# Patient Record
Sex: FEMALE | Race: WHITE | NOT HISPANIC OR LATINO | Employment: FULL TIME | URBAN - METROPOLITAN AREA
[De-identification: names, ages, dates, MRNs, and addresses within clinical notes are randomized per-mention and may not be internally consistent; named-entity substitution may affect disease eponyms.]

---

## 2017-01-06 DIAGNOSIS — Z12.31 OTHER SCREENING MAMMOGRAM: ICD-10-CM

## 2017-02-21 ENCOUNTER — PATIENT MESSAGE (OUTPATIENT)
Dept: FAMILY MEDICINE | Facility: CLINIC | Age: 42
End: 2017-02-21

## 2017-02-23 ENCOUNTER — OFFICE VISIT (OUTPATIENT)
Dept: FAMILY MEDICINE | Facility: CLINIC | Age: 42
End: 2017-02-23
Payer: COMMERCIAL

## 2017-02-23 ENCOUNTER — LAB VISIT (OUTPATIENT)
Dept: LAB | Facility: HOSPITAL | Age: 42
End: 2017-02-23
Payer: COMMERCIAL

## 2017-02-23 VITALS
HEART RATE: 84 BPM | BODY MASS INDEX: 24.92 KG/M2 | SYSTOLIC BLOOD PRESSURE: 97 MMHG | DIASTOLIC BLOOD PRESSURE: 61 MMHG | TEMPERATURE: 99 F | WEIGHT: 149.56 LBS | HEIGHT: 65 IN

## 2017-02-23 DIAGNOSIS — N92.6 MENSTRUAL ABNORMALITY: ICD-10-CM

## 2017-02-23 DIAGNOSIS — R60.0 BILATERAL LOWER EXTREMITY EDEMA: Primary | ICD-10-CM

## 2017-02-23 LAB
ALBUMIN SERPL BCP-MCNC: 3.7 G/DL
ALP SERPL-CCNC: 31 U/L
ALT SERPL W/O P-5'-P-CCNC: 27 U/L
ANION GAP SERPL CALC-SCNC: 6 MMOL/L
AST SERPL-CCNC: 15 U/L
B-HCG UR QL: POSITIVE
BACTERIA #/AREA URNS HPF: ABNORMAL /HPF
BASOPHILS # BLD AUTO: 0.07 K/UL
BASOPHILS NFR BLD: 1 %
BILIRUB SERPL-MCNC: 0.3 MG/DL
BILIRUB UR QL STRIP: NEGATIVE
BNP SERPL-MCNC: 50 PG/ML
BUN SERPL-MCNC: 13 MG/DL
CALCIUM SERPL-MCNC: 9 MG/DL
CHLORIDE SERPL-SCNC: 104 MMOL/L
CLARITY UR: CLEAR
CO2 SERPL-SCNC: 25 MMOL/L
COLOR UR: YELLOW
CREAT SERPL-MCNC: 0.7 MG/DL
CREAT UR-MCNC: 84 MG/DL
CTP QC/QA: YES
DIFFERENTIAL METHOD: ABNORMAL
EOSINOPHIL # BLD AUTO: 0.1 K/UL
EOSINOPHIL NFR BLD: 1.9 %
ERYTHROCYTE [DISTWIDTH] IN BLOOD BY AUTOMATED COUNT: 13.2 %
EST. GFR  (AFRICAN AMERICAN): >60 ML/MIN/1.73 M^2
EST. GFR  (NON AFRICAN AMERICAN): >60 ML/MIN/1.73 M^2
GLUCOSE SERPL-MCNC: 87 MG/DL
GLUCOSE UR QL STRIP: NEGATIVE
HCT VFR BLD AUTO: 37 %
HGB BLD-MCNC: 12.2 G/DL
HGB UR QL STRIP: NEGATIVE
KETONES UR QL STRIP: NEGATIVE
LEUKOCYTE ESTERASE UR QL STRIP: ABNORMAL
LYMPHOCYTES # BLD AUTO: 1.5 K/UL
LYMPHOCYTES NFR BLD: 19.9 %
MCH RBC QN AUTO: 30.8 PG
MCHC RBC AUTO-ENTMCNC: 33 %
MCV RBC AUTO: 93 FL
MICROALBUMIN UR DL<=1MG/L-MCNC: 3 UG/ML
MICROALBUMIN/CREATININE RATIO: 3.6 UG/MG
MICROSCOPIC COMMENT: ABNORMAL
MONOCYTES # BLD AUTO: 0.8 K/UL
MONOCYTES NFR BLD: 11.1 %
NEUTROPHILS # BLD AUTO: 4.8 K/UL
NEUTROPHILS NFR BLD: 66 %
NITRITE UR QL STRIP: NEGATIVE
PH UR STRIP: 6 [PH] (ref 5–8)
PLATELET # BLD AUTO: 323 K/UL
PMV BLD AUTO: 10.1 FL
POTASSIUM SERPL-SCNC: 3.8 MMOL/L
PROT SERPL-MCNC: 6.7 G/DL
PROT UR QL STRIP: NEGATIVE
RBC # BLD AUTO: 3.96 M/UL
SODIUM SERPL-SCNC: 135 MMOL/L
SP GR UR STRIP: 1.02 (ref 1–1.03)
SQUAMOUS #/AREA URNS HPF: 5 /HPF
T4 FREE SERPL-MCNC: 1.2 NG/DL
TSH SERPL DL<=0.005 MIU/L-ACNC: 0.08 UIU/ML
URN SPEC COLLECT METH UR: ABNORMAL
WBC # BLD AUTO: 7.28 K/UL
WBC #/AREA URNS HPF: 3 /HPF (ref 0–5)

## 2017-02-23 PROCEDURE — 36415 COLL VENOUS BLD VENIPUNCTURE: CPT | Mod: PO

## 2017-02-23 PROCEDURE — 83880 ASSAY OF NATRIURETIC PEPTIDE: CPT

## 2017-02-23 PROCEDURE — 99999 PR PBB SHADOW E&M-EST. PATIENT-LVL III: CPT | Mod: PBBFAC,,,

## 2017-02-23 PROCEDURE — 80053 COMPREHEN METABOLIC PANEL: CPT

## 2017-02-23 PROCEDURE — 84443 ASSAY THYROID STIM HORMONE: CPT

## 2017-02-23 PROCEDURE — 82570 ASSAY OF URINE CREATININE: CPT

## 2017-02-23 PROCEDURE — 99213 OFFICE O/P EST LOW 20 MIN: CPT | Mod: S$GLB,,,

## 2017-02-23 PROCEDURE — 1160F RVW MEDS BY RX/DR IN RCRD: CPT | Mod: S$GLB,,,

## 2017-02-23 PROCEDURE — 81000 URINALYSIS NONAUTO W/SCOPE: CPT | Mod: PO

## 2017-02-23 PROCEDURE — 81025 URINE PREGNANCY TEST: CPT | Mod: S$GLB,,,

## 2017-02-23 PROCEDURE — 85025 COMPLETE CBC W/AUTO DIFF WBC: CPT

## 2017-02-23 PROCEDURE — 84439 ASSAY OF FREE THYROXINE: CPT

## 2017-02-23 NOTE — MR AVS SNAPSHOT
[facility]  [address] [address]  Phone: [phone]  Fax: [phone]                  Vijaya Redding   2017 4:20 PM   Office Visit    Description:  Female : 1975   Provider:  JIM Samuel   Department:  [facility]           Reason for Visit     Foot Swelling           Diagnoses this Visit        Comments    Bilateral lower extremity edema    -  Primary     Menstrual abnormality                To Do List           Goals (5 Years of Data)     None      [facility]     [facility] Care [facility] -  Assistance  Registered nurses in the [facility] Center provide clinical advisement, health education, appointment booking, and other advisory services.  Call for this free service at [phone].             Medications           Message regarding Medications     Verify the changes and/or additions to your medication regime listed below are the same as discussed with your clinician today.  If any of these changes or additions are incorrect, please notify your healthcare provider.        STOP taking these medications     albuterol 90 mcg/actuation inhaler Inhale 2 puffs into the lungs every 6 (six) hours as needed for Wheezing.    dextroamphetamine-amphetamine (ADDERALL XR) 25 MG 24 hr capsule Take 25 mg by mouth every morning.    dextroamphetamine-amphetamine (ADDERALL) 20 mg tablet Take 1 tablet by mouth once daily.    fluticasone (FLONASE) 50 mcg/actuation nasal spray 2 sprays by Each Nare route once daily.    montelukast (SINGULAIR) 10 mg tablet Take 10 mg by mouth once daily.           Verify that the below list of medications is an accurate representation of the medications you are currently taking.  If none reported, the list may be blank. If incorrect, please contact your healthcare provider. Carry this list with you in case of emergency.           Current Medications     rizatriptan (MAXALT-MLT) 10 MG  "disintegrating tablet Take 1 tablet (10 mg total) by mouth as needed for Migraine.           Clinical Reference Information           Your Vitals Were     BP Pulse Temp Height    97/61 (BP Location: Left arm, Patient Position: Sitting, BP Method: Automatic) 84 98.5 °F (36.9 °C) (Oral) 5' 5" (1.651 m)    Weight Last Period BMI    67.8 kg (149 lb 9.3 oz) 01/02/2017 (Exact Date) 24.89 kg/m2      Blood Pressure          Most Recent Value    BP  97/61      Allergies as of 2/23/2017     Sulfa (Sulfonamide Antibiotics)      Immunizations Administered on Date of Encounter - 2/23/2017     None      Orders Placed During Today's Visit      Normal Orders This Visit    Microalbumin/creatinine urine ratio     POCT Urine Pregnancy     Urinalysis     Future Labs/Procedures Expected by Expires    Brain natriuretic peptide  2/23/2017 4/24/2018    CBC auto differential  2/23/2017 4/24/2018    Comprehensive metabolic panel  2/23/2017 4/24/2018    TSH  2/23/2017 4/24/2018      Language Assistance Services     ATTENTION: Language assistance services are available, free of charge. Please call [phone].      ATENCIÓN: Si habla español, tiene a fuentes disposición servicios gratuitos de asistencia lingüística. Llame al [phone].     WILLIAM Ý: N?u b?n nói Ti?ng Vi?t, có các d?ch v? h? tr? ngôn ng? mi?n phí dành cho b?n. G?i s? [phone].         [facility] complies with applicable Federal civil rights laws and does not discriminate on the basis of race, color, national origin, age, disability, or sex.        "

## 2017-02-23 NOTE — PROGRESS NOTES
Subjective:       Patient ID: Vijaya Redding is a 41 y.o. female.    Chief Complaint: Foot Swelling    HPI   The patient presents today with a two-week complaint of bilateral foot swelling.  She says the swelling is intermittent and mild in intensity.  She says it does resolve after sleeping at night but usually within a few hours of waking the swelling has returned.  She says in the evenings when she squats to do things she can feel a tightness in her legs.  She denies any pain in the legs.  The patient states she does not eat excessive amounts of sodium.  She denies any chest pain or shortness of breath.    The patient notes incidentally that she missed her menstrual cycle this month she says they usually occur around the beginning of the month she has regular cycles usually.  She states she is sexually active in a monogamous relationship with her  they are not using any form of birth control.  She says she has an appointment with her OB/GYN next week.     Review of Systems   Constitutional: Negative for activity change, appetite change, fatigue and unexpected weight change.   HENT: Negative.    Eyes: Negative.    Respiratory: Negative for cough, chest tightness, shortness of breath and wheezing.    Cardiovascular: Positive for leg swelling. Negative for chest pain and palpitations.   Gastrointestinal: Negative for constipation, diarrhea, nausea and vomiting.   Endocrine: Negative.    Genitourinary: Negative.    Musculoskeletal: Negative.    Skin: Negative for color change.   Allergic/Immunologic: Negative.    Neurological: Negative for dizziness, weakness and light-headedness.   Hematological: Negative.    Psychiatric/Behavioral: Negative for sleep disturbance.         Objective:      Physical Exam   Constitutional: She is oriented to person, place, and time. She appears well-developed and well-nourished.   HENT:   Head: Normocephalic and atraumatic.   Eyes: Conjunctivae are normal. Pupils are equal,  round, and reactive to light. No scleral icterus.   Neck: Normal range of motion. Neck supple.   Cardiovascular: Normal rate, regular rhythm, normal heart sounds and intact distal pulses.  Exam reveals no gallop and no friction rub.    No murmur heard.  Pulses:       Radial pulses are 2+ on the right side, and 2+ on the left side.        Dorsalis pedis pulses are 2+ on the right side, and 2+ on the left side.        Posterior tibial pulses are 2+ on the right side, and 2+ on the left side.   1+ edema to bilateral lower extremities   Pulmonary/Chest: Effort normal and breath sounds normal. No respiratory distress. She has no wheezes. She has no rales. She exhibits no tenderness.   Musculoskeletal: Normal range of motion.   Lymphadenopathy:     She has no cervical adenopathy.   Neurological: She is alert and oriented to person, place, and time. She exhibits normal muscle tone. Coordination normal.   Skin: Skin is warm and dry. No rash noted. No erythema. No pallor.   Psychiatric: She has a normal mood and affect. Her behavior is normal. Judgment and thought content normal.   Vitals reviewed.      Assessment:       1. Bilateral lower extremity edema    2. Menstrual abnormality          Plan:   Bilateral lower extremity edema  -     Urinalysis  -     Microalbumin/creatinine urine ratio  -     Brain natriuretic peptide; Future; Expected date: 2/23/17  -     CBC auto differential; Future; Expected date: 2/23/17  -     Comprehensive metabolic panel; Future; Expected date: 2/23/17  -     TSH; Future; Expected date: 2/23/17  -     Urinalysis Microscopic        -      He was advised to elevate her legs at night and use compression hose.     Menstrual abnormality  -     Urinalysis  -     Microalbumin/creatinine urine ratio  -     Brain natriuretic peptide; Future; Expected date: 2/23/17  -     CBC auto differential; Future; Expected date: 2/23/17  -     Comprehensive metabolic panel; Future; Expected date: 2/23/17  -     TSH;  Future; Expected date: 2/23/17  -     POCT Urine Pregnancy, positive  -     Urinalysis Microscopic        -     The patient was advised to keep her appointment with her OB/GYN        -     She was offered prenatal vitamins but said she would wait and see which ones are OB/GYN recommends.           Disclaimer: This note is prepared using voice recognition software.  As such there may be errors in the dictation.  It has not been proofread.

## 2017-02-24 DIAGNOSIS — R79.89 LOW TSH LEVEL: Primary | ICD-10-CM

## 2017-03-17 ENCOUNTER — TELEPHONE (OUTPATIENT)
Dept: ENDOCRINOLOGY | Facility: CLINIC | Age: 42
End: 2017-03-17

## 2017-03-17 NOTE — TELEPHONE ENCOUNTER
attempted to reach pt, appt was scheduled per dr alvarenga for [facility], educated pt to call back if unable to make this appt

## 2017-03-17 NOTE — TELEPHONE ENCOUNTER
----- Message from Chau Small sent at 3/16/2017 11:16 AM CDT -----  Contact: self   [phone]  Patient needs an earlier appointment to see the doctor for thyroid problem. Patient is 10 weeks pregnant, asking to be see earlier. Patient will be in the [place] area on 03/29/2017, patient asking to be seen if possible on this day.Thanks!

## 2017-03-23 ENCOUNTER — OFFICE VISIT (OUTPATIENT)
Dept: ENDOCRINOLOGY | Facility: CLINIC | Age: 42
End: 2017-03-23
Payer: COMMERCIAL

## 2017-03-23 VITALS
HEART RATE: 76 BPM | WEIGHT: 147.69 LBS | HEIGHT: 65 IN | BODY MASS INDEX: 24.61 KG/M2 | SYSTOLIC BLOOD PRESSURE: 101 MMHG | DIASTOLIC BLOOD PRESSURE: 63 MMHG

## 2017-03-23 DIAGNOSIS — R79.89 ABNORMAL TSH: ICD-10-CM

## 2017-03-23 DIAGNOSIS — R00.2 PALPITATIONS: ICD-10-CM

## 2017-03-23 DIAGNOSIS — E05.90 SUBCLINICAL HYPERTHYROIDISM: Primary | ICD-10-CM

## 2017-03-23 PROCEDURE — 99204 OFFICE O/P NEW MOD 45 MIN: CPT | Mod: S$GLB,,, | Performed by: INTERNAL MEDICINE

## 2017-03-23 PROCEDURE — 99999 PR PBB SHADOW E&M-EST. PATIENT-LVL III: CPT | Mod: PBBFAC,,, | Performed by: INTERNAL MEDICINE

## 2017-03-23 PROCEDURE — 1160F RVW MEDS BY RX/DR IN RCRD: CPT | Mod: S$GLB,,, | Performed by: INTERNAL MEDICINE

## 2017-03-23 NOTE — LETTER
March 23, 2017      Dharmesh Gibbons DO  [address] [facility] [address] [place] [address]           [facility]  [address]  [facility] [address]  Phone: [phone]  Fax: [phone]          Patient: Vijaya Redding   MR Number: [id number]   YOB: 1975   Date of Visit: 3/23/2017       Dear Dr. Dharmesh Gibbons:    Thank you for referring Vijaya Redding to me for evaluation. Attached you will find relevant portions of my assessment and plan of care.    If you have questions, please do not hesitate to call me. I look forward to following Vijaya Redding along with you.    Sincerely,    Carlos Eduardo Albarran MD    Enclosure  CC:  No Recipients    If you would like to receive this communication electronically, please contact [email or web address] or [phone] to request more information on [employer] Link access.    For providers and/or their staff who would like to refer a patient to [facility], please contact us through our one-stop-shop provider referral line, [facility], at [phone].    If you feel you have received this communication in error or would no longer like to receive these types of communications, please e-mail [email or web address]

## 2017-03-23 NOTE — MR AVS SNAPSHOT
"    [facility]  [address]  [place] [address]  Phone: [phone]  Fax: [phone]                  Vijaya Redding   3/23/2017 11:00 AM   Office Visit    Description:  Female : 1975   Provider:  Carlos Eduardo Albarran MD   Department:  [facility]           Reason for Visit     Thyroid Problem           Diagnoses this Visit        Comments    Abnormal TSH                To Do List           Future Appointments        Provider Department Dept Phone    3/23/2017 11:50 AM LAB, APPOINTMENT [place] [facility] [phone]      Goals (5 Years of Data)     None      [facility]     [facility] Nurse Care Line -  Assistance  Registered nurses in the [facility] provide clinical advisement, health education, appointment booking, and other advisory services.  Call for this free service at [phone].             Medications           Message regarding Medications     Verify the changes and/or additions to your medication regime listed below are the same as discussed with your clinician today.  If any of these changes or additions are incorrect, please notify your healthcare provider.             Verify that the below list of medications is an accurate representation of the medications you are currently taking.  If none reported, the list may be blank. If incorrect, please contact your healthcare provider. Carry this list with you in case of emergency.           Current Medications     rizatriptan (MAXALT-MLT) 10 MG disintegrating tablet Take 1 tablet (10 mg total) by mouth as needed for Migraine.           Clinical Reference Information           Your Vitals Were     BP Pulse Height Weight Last Period BMI    101/63 77 5' 5" (1.651 m) 67 kg (147 lb 11.3 oz) 2017 (Exact Date) 24.58 kg/m2      Blood Pressure          Most Recent Value    BP  101/63      Allergies as of 3/23/2017     Sulfa (Sulfonamide Antibiotics)    "   Immunizations Administered on Date of Encounter - 3/23/2017     None      Orders Placed During Today's Visit     Future Labs/Procedures Expected by Expires    HCG, QUANTITATIVE, PREGNANCY  3/23/2017 5/22/2018    T3  3/23/2017 5/22/2018    T4, free  3/23/2017 5/22/2018    Thyroid stimulating immunoglobulin  3/23/2017 5/22/2018    THYROTROPIN RECEPTOR ANTIBODY  3/23/2017 5/22/2018    TSH  3/23/2017 5/22/2018      Language Assistance Services     ATTENTION: Language assistance services are available, free of charge. Please call [phone].      ATENCIÓN: Si habla español, tiene a fuentes disposición servicios gratuitos de asistencia lingüística. Llame al [phone].     CHÚ Ý: N?u b?n nói Ti?ng Vi?t, có các d?ch v? h? tr? ngôn ng? mi?n phí dành cho b?n. G?i s? [phone].         [facility] complies with applicable Federal civil rights laws and does not discriminate on the basis of race, color, national origin, age, disability, or sex.

## 2017-03-23 NOTE — PROGRESS NOTES
Subjective:      Patient ID: Vijaya Redding is a 41 y.o. female.    Chief Complaint:  Thyroid Problem      History of Present Illness  Ms. Redding presents for evaluation and management of a suppressed TSH.     Has active history of migraines.     Was seen by primary care and had labs done at that time that showed that she had a low TSH with normal FT4 and positive pregnancy test.    LMP was Jan 2nd. Currently 11 weeks pregnant.    Has occasional heart palpitations, but not persistent. Has overt fatigue. Has had some heat intolerance and night sweats.  Bowel movements more frequent. No excessive hair loss.     No anterior neck fullness or pain. No dysphagia.     Denies proptosis, eyelid swelling, eye pain, or conjunctival erythema.    No OTC supplements. Only on B6 and folic acid.     Has been having excessive nausea and has been vomiting twice daily. Has been trying to stay well hydrated.    Review of Systems   Constitutional: Negative for unexpected weight change.   HENT: Negative for voice change.    Eyes: Negative for visual disturbance.   Respiratory: Negative for shortness of breath.    Cardiovascular: Positive for leg swelling. Negative for chest pain.   Gastrointestinal: Negative for abdominal pain.   Endocrine: Negative for cold intolerance.   Genitourinary: Negative for frequency.   Musculoskeletal: Negative for myalgias.   Skin: Negative for rash.   As above    Objective:   Physical Exam   Constitutional: She is oriented to person, place, and time. She appears well-developed and well-nourished.   HENT:   Head: Normocephalic and atraumatic.   Right Ear: External ear normal.   Left Ear: External ear normal.   Nose: Nose normal.   No proptosis, eyelid swelling or conjunctival erythema   Neck: No tracheal deviation present.   No goiter   Cardiovascular: Normal rate, regular rhythm and normal heart sounds.    No edema   Pulmonary/Chest: Effort normal and breath sounds normal.   Abdominal: Soft. Bowel  sounds are normal. There is no tenderness.   Musculoskeletal:   Normal gait, no cyanosis or clubbing   Neurological: She is alert and oriented to person, place, and time. She has normal reflexes.   Vibration sense intact  No tremor   Skin: Skin is warm and dry. No rash noted.   No nodules, no ulcers   Psychiatric: She has a normal mood and affect. Judgment normal.   Vitals reviewed.  No Tachycardia    Lab Review:   Results for PETRA VILLELA (MRN [id number]) as of 3/23/2017 11:02   Ref. Range 2/23/2017 16:59 2/23/2017 17:08   WBC Latest Ref Range: 3.90 - 12.70 K/uL 7.28    RBC Latest Ref Range: 4.00 - 5.40 M/uL 3.96 (L)    Hemoglobin Latest Ref Range: 12.0 - 16.0 g/dL 12.2    Hematocrit Latest Ref Range: 37.0 - 48.5 % 37.0    MCV Latest Ref Range: 82 - 98 fL 93    MCH Latest Ref Range: 27.0 - 31.0 pg 30.8    MCHC Latest Ref Range: 32.0 - 36.0 % 33.0    RDW Latest Ref Range: 11.5 - 14.5 % 13.2    Platelets Latest Ref Range: 150 - 350 K/uL 323    MPV Latest Ref Range: 9.2 - 12.9 fL 10.1    Gran% Latest Ref Range: 38.0 - 73.0 % 66.0    Gran # Latest Ref Range: 1.8 - 7.7 K/uL 4.8    Lymph% Latest Ref Range: 18.0 - 48.0 % 19.9    Lymph # Latest Ref Range: 1.0 - 4.8 K/uL 1.5    Mono% Latest Ref Range: 4.0 - 15.0 % 11.1    Mono # Latest Ref Range: 0.3 - 1.0 K/uL 0.8    Eosinophil% Latest Ref Range: 0.0 - 8.0 % 1.9    Eos # Latest Ref Range: 0.0 - 0.5 K/uL 0.1    Basophil% Latest Ref Range: 0.0 - 1.9 % 1.0    Baso # Latest Ref Range: 0.00 - 0.20 K/uL 0.07    Sodium Latest Ref Range: 136 - 145 mmol/L 135 (L)    Potassium Latest Ref Range: 3.5 - 5.1 mmol/L 3.8    Chloride Latest Ref Range: 95 - 110 mmol/L 104    CO2 Latest Ref Range: 23 - 29 mmol/L 25    Anion Gap Latest Ref Range: 8 - 16 mmol/L 6 (L)    BUN, Bld Latest Ref Range: 6 - 20 mg/dL 13    Creatinine Latest Ref Range: 0.5 - 1.4 mg/dL 0.7    eGFR if non African American Latest Ref Range: >60 mL/min/1.73 m^2 >60.0    eGFR if African American Latest Ref Range: >60  mL/min/1.73 m^2 >60.0    Glucose Latest Ref Range: 70 - 110 mg/dL 87    Calcium Latest Ref Range: 8.7 - 10.5 mg/dL 9.0    Alkaline Phosphatase Latest Ref Range: 55 - 135 U/L 31 (L)    Total Protein Latest Ref Range: 6.0 - 8.4 g/dL 6.7    Albumin Latest Ref Range: 3.5 - 5.2 g/dL 3.7    Total Bilirubin Latest Ref Range: 0.1 - 1.0 mg/dL 0.3    AST Latest Ref Range: 10 - 40 U/L 15    ALT Latest Ref Range: 10 - 44 U/L 27    BNP Latest Ref Range: 0 - 99 pg/mL 50    TSH Latest Ref Range: 0.400 - 4.000 uIU/mL 0.084 (L)    Free T4 Latest Ref Range: 0.71 - 1.51 ng/dL 1.20    Preg Test, Ur Latest Ref Range: Negative   Positive (A)       Assessment:     1. Subclinical hyperthyroidism    2. Abnormal TSH    3. Palpitations      Plan:     --Patient found to have suppressed TSH associated with palpitations and leg swelling  --Currently 11 weeks pregnant  --Differential includes gestational hyperthyroidism vs autoimmune thyroid disease (Grave's)  --Timing of symptoms, hyperemesis gravidarum, lack of goiter and lack of Graves orbitopathy make gestational hyperthyroidism the most likely diagnosis  --Will check TRAb and TSI to be sure this isn't Graves disease  --Treatment for gestational hyperthyroidism is supportive only, but B blocker could be used if needed for symptom control should symptoms worsen (would have to be approved by Ob first)  --In any event she does not meet criteria for methimazole or PTU at this time as the FT4 and total T3 are in the normal range  --Will plan to repeat TFT's in 4-6 weeks    Carlos Eduardo Albarran M.D. Staff Endocrinology

## 2017-03-24 ENCOUNTER — PATIENT MESSAGE (OUTPATIENT)
Dept: ENDOCRINOLOGY | Facility: CLINIC | Age: 42
End: 2017-03-24

## 2017-03-27 ENCOUNTER — PATIENT MESSAGE (OUTPATIENT)
Dept: ENDOCRINOLOGY | Facility: CLINIC | Age: 42
End: 2017-03-27

## 2017-03-27 DIAGNOSIS — E05.90 SUBCLINICAL HYPERTHYROIDISM: Primary | ICD-10-CM

## 2017-04-24 ENCOUNTER — LAB VISIT (OUTPATIENT)
Dept: LAB | Facility: HOSPITAL | Age: 42
End: 2017-04-24
Attending: INTERNAL MEDICINE
Payer: COMMERCIAL

## 2017-04-24 DIAGNOSIS — E05.90 SUBCLINICAL HYPERTHYROIDISM: ICD-10-CM

## 2017-04-24 PROCEDURE — 84439 ASSAY OF FREE THYROXINE: CPT

## 2017-04-24 PROCEDURE — 84443 ASSAY THYROID STIM HORMONE: CPT

## 2017-04-24 PROCEDURE — 36415 COLL VENOUS BLD VENIPUNCTURE: CPT | Mod: PO

## 2017-04-24 PROCEDURE — 84480 ASSAY TRIIODOTHYRONINE (T3): CPT

## 2017-04-25 LAB
T3 SERPL-MCNC: 125 NG/DL
T4 FREE SERPL-MCNC: 0.81 NG/DL
TSH SERPL DL<=0.005 MIU/L-ACNC: 0.48 UIU/ML

## 2017-04-26 ENCOUNTER — PATIENT MESSAGE (OUTPATIENT)
Dept: ENDOCRINOLOGY | Facility: CLINIC | Age: 42
End: 2017-04-26

## 2017-04-26 DIAGNOSIS — E05.90 SUBCLINICAL HYPERTHYROIDISM: Primary | ICD-10-CM

## 2017-05-01 ENCOUNTER — PATIENT MESSAGE (OUTPATIENT)
Dept: FAMILY MEDICINE | Facility: CLINIC | Age: 42
End: 2017-05-01

## 2017-05-22 ENCOUNTER — LAB VISIT (OUTPATIENT)
Dept: LAB | Facility: HOSPITAL | Age: 42
End: 2017-05-22
Attending: INTERNAL MEDICINE
Payer: COMMERCIAL

## 2017-05-22 DIAGNOSIS — E05.90 SUBCLINICAL HYPERTHYROIDISM: ICD-10-CM

## 2017-05-22 LAB
T3 SERPL-MCNC: 124 NG/DL
T4 FREE SERPL-MCNC: 0.84 NG/DL
TSH SERPL DL<=0.005 MIU/L-ACNC: 0.45 UIU/ML

## 2017-05-22 PROCEDURE — 84443 ASSAY THYROID STIM HORMONE: CPT

## 2017-05-22 PROCEDURE — 36415 COLL VENOUS BLD VENIPUNCTURE: CPT | Mod: PO

## 2017-05-22 PROCEDURE — 84439 ASSAY OF FREE THYROXINE: CPT

## 2017-05-22 PROCEDURE — 84480 ASSAY TRIIODOTHYRONINE (T3): CPT

## 2017-12-01 DIAGNOSIS — R51.9 INTRACTABLE EPISODIC HEADACHE, UNSPECIFIED HEADACHE TYPE: ICD-10-CM

## 2017-12-01 RX ORDER — RIZATRIPTAN BENZOATE 10 MG/1
10 TABLET, ORALLY DISINTEGRATING ORAL
Qty: 9 TABLET | Refills: 3 | Status: SHIPPED | OUTPATIENT
Start: 2017-12-01 | End: 2018-10-30 | Stop reason: SDUPTHER

## 2018-02-07 ENCOUNTER — OFFICE VISIT (OUTPATIENT)
Dept: FAMILY MEDICINE | Facility: CLINIC | Age: 43
End: 2018-02-07
Payer: COMMERCIAL

## 2018-02-07 VITALS
DIASTOLIC BLOOD PRESSURE: 60 MMHG | SYSTOLIC BLOOD PRESSURE: 90 MMHG | HEART RATE: 90 BPM | HEIGHT: 65 IN | BODY MASS INDEX: 26.67 KG/M2 | TEMPERATURE: 98 F | WEIGHT: 160.06 LBS

## 2018-02-07 DIAGNOSIS — R60.9 EDEMA, UNSPECIFIED TYPE: ICD-10-CM

## 2018-02-07 DIAGNOSIS — R05.9 COUGH: ICD-10-CM

## 2018-02-07 DIAGNOSIS — J02.9 SORE THROAT: Primary | ICD-10-CM

## 2018-02-07 DIAGNOSIS — R50.9 FEVER, UNSPECIFIED FEVER CAUSE: ICD-10-CM

## 2018-02-07 LAB
FLUAV AG SPEC QL IA: POSITIVE
FLUBV AG SPEC QL IA: NEGATIVE
SPECIMEN SOURCE: ABNORMAL

## 2018-02-07 PROCEDURE — 93010 ELECTROCARDIOGRAM REPORT: CPT | Mod: S$GLB,,, | Performed by: INTERNAL MEDICINE

## 2018-02-07 PROCEDURE — 99999 PR PBB SHADOW E&M-EST. PATIENT-LVL III: CPT | Mod: PBBFAC,,, | Performed by: NURSE PRACTITIONER

## 2018-02-07 PROCEDURE — 99213 OFFICE O/P EST LOW 20 MIN: CPT | Mod: 25,S$GLB,, | Performed by: NURSE PRACTITIONER

## 2018-02-07 PROCEDURE — 93005 ELECTROCARDIOGRAM TRACING: CPT | Mod: S$GLB,,, | Performed by: NURSE PRACTITIONER

## 2018-02-07 PROCEDURE — 87400 INFLUENZA A/B EACH AG IA: CPT | Mod: 59,PO

## 2018-02-07 PROCEDURE — 3008F BODY MASS INDEX DOCD: CPT | Mod: S$GLB,,, | Performed by: NURSE PRACTITIONER

## 2018-02-07 PROCEDURE — 96372 THER/PROPH/DIAG INJ SC/IM: CPT | Mod: S$GLB,,, | Performed by: FAMILY MEDICINE

## 2018-02-07 RX ORDER — OSELTAMIVIR PHOSPHATE 75 MG/1
75 CAPSULE ORAL 2 TIMES DAILY
Qty: 10 CAPSULE | Refills: 0 | Status: SHIPPED | OUTPATIENT
Start: 2018-02-07 | End: 2018-02-12

## 2018-02-07 RX ORDER — AMOXICILLIN 875 MG/1
875 TABLET, FILM COATED ORAL EVERY 12 HOURS
Qty: 20 TABLET | Refills: 0 | Status: SHIPPED | OUTPATIENT
Start: 2018-02-07 | End: 2018-02-07

## 2018-02-07 RX ORDER — DEXAMETHASONE SODIUM PHOSPHATE 4 MG/ML
8 INJECTION, SOLUTION INTRA-ARTICULAR; INTRALESIONAL; INTRAMUSCULAR; INTRAVENOUS; SOFT TISSUE
Status: COMPLETED | OUTPATIENT
Start: 2018-02-07 | End: 2018-02-07

## 2018-02-07 RX ADMIN — DEXAMETHASONE SODIUM PHOSPHATE 8 MG: 4 INJECTION, SOLUTION INTRA-ARTICULAR; INTRALESIONAL; INTRAMUSCULAR; INTRAVENOUS; SOFT TISSUE at 09:02

## 2018-02-07 NOTE — PATIENT INSTRUCTIONS
AMOXIL:  Self-limiting diarrhea, rash, and somnolence have been reported in nursing infants exposed to amoxicillin (Balaji 2005; Bradley 2009; Jus 1993); the  warns of the potential for allergic sensitization in the infant. In general, antibiotics that are present in breast milk may cause nondose-related modification of bowel geovanni. Monitor infants for GI disturbances, such as thrush or diarrhea (WHO 2002).    DECADRON:  The  notes that when used systemically, maternal use of corticosteroids have the potential to cause adverse events in a breastfeeding infant (eg, growth suppression, interfere with endogenous corticosteroid production). Due to the potential for serious adverse reactions in the breastfeeding infant, the  recommends a decision be made whether to discontinue breastfeeding or to discontinue the drug, taking into account the importance of treatment to the mother. Single doses of dexamethasone are considered compatible with breastfeeding; information related to prolonged use is not available (WHO 2002).

## 2018-02-07 NOTE — PROGRESS NOTES
Subjective:       Patient ID: Vijaya Redding is a 42 y.o. female.    Chief Complaint: Cough; Sore Throat; Laryngitis; and Nasal Congestion    Sore Throat    This is a new problem. The current episode started in the past 7 days. The problem has been unchanged. The maximum temperature recorded prior to her arrival was 100.4 - 100.9 F. The fever has been present for less than 1 day. The pain is moderate. Associated symptoms include congestion and coughing. Pertinent negatives include no abdominal pain, diarrhea, drooling, ear discharge, ear pain, headaches, hoarse voice, plugged ear sensation, neck pain, shortness of breath, stridor, swollen glands, trouble swallowing or vomiting. She has had no exposure to strep or mono. She has tried nothing for the symptoms. The treatment provided no relief (Pt is breastfeeding; states ob/gyn approved decadron injection).   Edema   This is a chronic problem. The current episode started more than 1 year ago (States began at beginning of her pregnancy. Delivered 4 m ago). The problem occurs daily. The problem has been unchanged. Associated symptoms include congestion, coughing and a sore throat. Pertinent negatives include no abdominal pain, anorexia, arthralgias, change in bowel habit, chest pain, chills, diaphoresis, fatigue, fever, headaches, joint swelling, myalgias, nausea, neck pain, numbness, rash, swollen glands, urinary symptoms, vertigo, visual change, vomiting or weakness. Nothing aggravates the symptoms. She has tried nothing (States consumes high sodium diet; CBC, CMP, TSH, BNP done for this 2/2017; unremarkable) for the symptoms. The treatment provided no relief.     Past Medical History:   Diagnosis Date    Abnormal Pap smear     repeat pap    Herpes genitalia     Migraines      Social History     Social History    Marital status:      Spouse name: N/A    Number of children: 2    Years of education: N/A     Occupational History     [employer]      Social History Main Topics    Smoking status: Former Smoker    Smokeless tobacco: Never Used      Comment: quit tobacco 20 years ago    Alcohol use Yes      Comment: socially    Drug use: No    Sexual activity: Yes     Partners: Male     Social History Narrative    No narrative on file     Past Surgical History:   Procedure Laterality Date    breast implants  2001    dx lap      laparoscope      TONSILLECTOMY         Review of Systems   Constitutional: Negative.  Negative for chills, diaphoresis, fatigue and fever.   HENT: Positive for congestion and sore throat. Negative for drooling, ear discharge, ear pain, hoarse voice and trouble swallowing.    Eyes: Negative.    Respiratory: Positive for cough. Negative for shortness of breath and stridor.    Cardiovascular: Positive for leg swelling. Negative for chest pain.   Gastrointestinal: Negative.  Negative for abdominal pain, anorexia, change in bowel habit, diarrhea, nausea and vomiting.   Endocrine: Negative.    Genitourinary: Negative.    Musculoskeletal: Negative.  Negative for arthralgias, joint swelling, myalgias and neck pain.   Skin: Negative.  Negative for rash.   Allergic/Immunologic: Negative.    Neurological: Negative.  Negative for vertigo, weakness, numbness and headaches.   Psychiatric/Behavioral: Negative.        Objective:      Physical Exam   Constitutional: She is oriented to person, place, and time. She appears well-developed and well-nourished.   HENT:   Head: Normocephalic.   Right Ear: Hearing, tympanic membrane, external ear and ear canal normal.   Left Ear: Hearing, tympanic membrane, external ear and ear canal normal.   Nose: Rhinorrhea present. Right sinus exhibits no maxillary sinus tenderness and no frontal sinus tenderness. Left sinus exhibits no maxillary sinus tenderness and no frontal sinus tenderness.   Mouth/Throat: Uvula is midline. Posterior oropharyngeal erythema present.   Eyes: Conjunctivae are normal. Pupils are  equal, round, and reactive to light.   Neck: Normal range of motion. Neck supple.   Cardiovascular: Normal rate, regular rhythm and normal heart sounds.    Pulmonary/Chest: Effort normal and breath sounds normal.   Abdominal: Soft. Bowel sounds are normal.   Musculoskeletal: Normal range of motion.        Right lower leg: Normal.        Left lower leg: Normal.   Neurological: She is alert and oriented to person, place, and time.   Skin: Skin is warm and dry. Capillary refill takes 2 to 3 seconds.   Psychiatric: She has a normal mood and affect. Her behavior is normal. Judgment and thought content normal.   Nursing note and vitals reviewed.      Assessment:       1. Sore throat    2. Cough    3. Fever, unspecified fever cause    4. Edema, unspecified type        Plan:           Vijaya was seen today for cough, sore throat, laryngitis and nasal congestion.    Diagnoses and all orders for this visit:    Sore throat  Cough  Fever, unspecified fever cause  -     Influenza antigen Nasal Swab  -     amoxicillin (AMOXIL) 875 MG tablet; Take 1 tablet (875 mg total) by mouth every 12 (twelve) hours.  -     dexamethasone injection 8 mg; Inject 2 mLs (8 mg total) into the muscle one time.        Information given regarding breastfeeding and potential effects of medications; verbalized understanding    Edema, unspecified type  -     2D Echo w/ Color Flow Doppler; Future  -     IN OFFICE EKG 12-LEAD (to Ji)

## 2018-02-08 ENCOUNTER — PATIENT MESSAGE (OUTPATIENT)
Dept: FAMILY MEDICINE | Facility: CLINIC | Age: 43
End: 2018-02-08

## 2018-02-21 ENCOUNTER — TELEPHONE (OUTPATIENT)
Dept: CARDIOLOGY | Facility: CLINIC | Age: 43
End: 2018-02-21

## 2018-10-30 DIAGNOSIS — R51.9 INTRACTABLE EPISODIC HEADACHE, UNSPECIFIED HEADACHE TYPE: ICD-10-CM

## 2018-10-30 RX ORDER — RIZATRIPTAN BENZOATE 10 MG/1
10 TABLET, ORALLY DISINTEGRATING ORAL
Qty: 9 TABLET | Refills: 1 | Status: SHIPPED | OUTPATIENT
Start: 2018-10-30 | End: 2019-07-09 | Stop reason: SDUPTHER

## 2019-07-09 ENCOUNTER — OFFICE VISIT (OUTPATIENT)
Dept: FAMILY MEDICINE | Facility: CLINIC | Age: 44
End: 2019-07-09
Payer: MEDICAID

## 2019-07-09 ENCOUNTER — TELEPHONE (OUTPATIENT)
Dept: FAMILY MEDICINE | Facility: CLINIC | Age: 44
End: 2019-07-09

## 2019-07-09 VITALS
HEIGHT: 65 IN | WEIGHT: 160 LBS | BODY MASS INDEX: 26.66 KG/M2 | DIASTOLIC BLOOD PRESSURE: 55 MMHG | HEART RATE: 75 BPM | TEMPERATURE: 98 F | SYSTOLIC BLOOD PRESSURE: 95 MMHG

## 2019-07-09 DIAGNOSIS — R51.9 INTRACTABLE EPISODIC HEADACHE, UNSPECIFIED HEADACHE TYPE: ICD-10-CM

## 2019-07-09 DIAGNOSIS — Z00.00 ANNUAL PHYSICAL EXAM: Primary | ICD-10-CM

## 2019-07-09 DIAGNOSIS — F98.8 ATTENTION DEFICIT DISORDER, UNSPECIFIED HYPERACTIVITY PRESENCE: Primary | ICD-10-CM

## 2019-07-09 DIAGNOSIS — Z76.0 MEDICATION REFILL: ICD-10-CM

## 2019-07-09 DIAGNOSIS — Z82.49 FAMILY HISTORY OF HEART DISEASE: ICD-10-CM

## 2019-07-09 DIAGNOSIS — R53.83 FATIGUE, UNSPECIFIED TYPE: ICD-10-CM

## 2019-07-09 LAB — B-HCG UR QL: NEGATIVE

## 2019-07-09 PROCEDURE — 99396 PR PREVENTIVE VISIT,EST,40-64: ICD-10-PCS | Mod: S$PBB,,, | Performed by: NURSE PRACTITIONER

## 2019-07-09 PROCEDURE — 99213 OFFICE O/P EST LOW 20 MIN: CPT | Mod: PBBFAC,PO | Performed by: NURSE PRACTITIONER

## 2019-07-09 PROCEDURE — 99396 PREV VISIT EST AGE 40-64: CPT | Mod: S$PBB,,, | Performed by: NURSE PRACTITIONER

## 2019-07-09 PROCEDURE — 81025 URINE PREGNANCY TEST: CPT | Mod: PO

## 2019-07-09 PROCEDURE — 99999 PR PBB SHADOW E&M-EST. PATIENT-LVL III: ICD-10-PCS | Mod: PBBFAC,,, | Performed by: NURSE PRACTITIONER

## 2019-07-09 PROCEDURE — 99999 PR PBB SHADOW E&M-EST. PATIENT-LVL III: CPT | Mod: PBBFAC,,, | Performed by: NURSE PRACTITIONER

## 2019-07-09 RX ORDER — NORETHINDRONE 0.35 MG/1
1 TABLET ORAL DAILY
Refills: 11 | COMMUNITY
Start: 2019-06-01 | End: 2020-06-08

## 2019-07-09 RX ORDER — RIZATRIPTAN BENZOATE 10 MG/1
10 TABLET, ORALLY DISINTEGRATING ORAL
Qty: 9 TABLET | Refills: 1 | Status: SHIPPED | OUTPATIENT
Start: 2019-07-09 | End: 2019-10-31 | Stop reason: SDUPTHER

## 2019-07-09 RX ORDER — VALACYCLOVIR HYDROCHLORIDE 1 G/1
1000 TABLET, FILM COATED ORAL 2 TIMES DAILY
Refills: 3 | COMMUNITY
Start: 2019-05-29 | End: 2020-02-06 | Stop reason: SDUPTHER

## 2019-07-09 NOTE — PROGRESS NOTES
Subjective:       Patient ID: Vijaya Redding is a 43 y.o. female.    Chief Complaint: Medication Refill and Headache  Pt in today for annual exam. Pt has chronic HA; controlled with current medication. She sees gyn for well woman exam; states UTD; records requested. Mammogram done at [facility] on 7/1/19; results reviewed. Labs due. Pt has ADD; states has been off of medication since pregnancy in 2017; request to restart medication.   Fatigue   This is a chronic (States since having mononucelosis in 2013) problem. The current episode started more than 1 year ago. The problem occurs daily. The problem has been waxing and waning. Associated symptoms include fatigue and headaches. Pertinent negatives include no abdominal pain, anorexia, arthralgias, change in bowel habit, chest pain, chills, congestion, coughing, diaphoresis, fever, joint swelling, myalgias, nausea, neck pain, numbness, rash, sore throat, swollen glands, urinary symptoms, vertigo, visual change, vomiting or weakness. Associated symptoms comments: Intermittent palpitations. Nothing aggravates the symptoms. She has tried nothing for the symptoms. The treatment provided no relief.     Past Medical History:   Diagnosis Date    Abnormal Pap smear     repeat pap    Herpes genitalia     Migraines      Social History     Socioeconomic History    Marital status:      Spouse name: Not on file    Number of children: 2    Years of education: Not on file    Highest education level: Not on file   Occupational History     Employer: [employer]   Social Needs    Financial resource strain: Not on file    Food insecurity:     Worry: Not on file     Inability: Not on file    Transportation needs:     Medical: Not on file     Non-medical: Not on file   Tobacco Use    Smoking status: Former Smoker    Smokeless tobacco: Never Used    Tobacco comment: quit tobacco 20 years ago   Substance and Sexual Activity    Alcohol use: Yes     Comment:  socially    Drug use: No    Sexual activity: Yes     Partners: Male   Lifestyle    Physical activity:     Days per week: Not on file     Minutes per session: Not on file    Stress: Not on file   Relationships    Social connections:     Talks on phone: Not on file     Gets together: Not on file     Attends Pentecostal service: Not on file     Active member of club or organization: Not on file     Attends meetings of clubs or organizations: Not on file     Relationship status: Not on file   Other Topics Concern    Not on file   Social History Narrative    Not on file     Past Surgical History:   Procedure Laterality Date    breast implants  2001    dx lap      laparoscope      TONSILLECTOMY         Review of Systems   Constitutional: Positive for fatigue. Negative for activity change, chills, diaphoresis, fever and unexpected weight change.   HENT: Negative.  Negative for congestion, hearing loss, rhinorrhea, sore throat and trouble swallowing.    Eyes: Negative.  Negative for discharge and visual disturbance.   Respiratory: Negative.  Negative for cough, chest tightness and wheezing.    Cardiovascular: Negative.  Negative for chest pain and palpitations.   Gastrointestinal: Negative.  Negative for abdominal pain, anorexia, blood in stool, change in bowel habit, constipation, diarrhea, nausea and vomiting.   Endocrine: Negative.  Negative for polydipsia and polyuria.   Genitourinary: Negative.  Negative for difficulty urinating, dysuria, hematuria and menstrual problem.   Musculoskeletal: Negative.  Negative for arthralgias, joint swelling, myalgias and neck pain.   Skin: Negative.  Negative for rash.   Allergic/Immunologic: Negative.    Neurological: Positive for headaches. Negative for vertigo, weakness and numbness.   Psychiatric/Behavioral: Negative.  Negative for confusion and dysphoric mood.       Objective:      Physical Exam   Constitutional: She is oriented to person, place, and time. She appears  well-developed and well-nourished.   HENT:   Head: Normocephalic.   Right Ear: External ear normal.   Left Ear: External ear normal.   Nose: Nose normal.   Mouth/Throat: Oropharynx is clear and moist.   Eyes: Pupils are equal, round, and reactive to light. Conjunctivae are normal.   Neck: Normal range of motion. Neck supple.   Cardiovascular: Normal rate, regular rhythm and normal heart sounds.   Pulmonary/Chest: Effort normal and breath sounds normal.   Abdominal: Soft. Bowel sounds are normal.   Musculoskeletal: Normal range of motion.   Neurological: She is alert and oriented to person, place, and time.   Skin: Skin is warm and dry. Capillary refill takes 2 to 3 seconds.   Psychiatric: She has a normal mood and affect. Her behavior is normal. Judgment and thought content normal.   Nursing note and vitals reviewed.      Assessment:       1. Annual physical exam    2. Intractable episodic headache, unspecified headache type    3. Family history of heart disease    4. Fatigue, unspecified type    5. Medication refill        Plan:           Vijaya was seen today for medication refill and headache.    Diagnoses and all orders for this visit:    Annual physical exam  Intractable episodic headache, unspecified headache type  Family history of heart disease  Fatigue, unspecified type  Medication refill  -     Lipid panel; Future  -     TSH; Future  -     Comprehensive metabolic panel; Future  -     CBC auto differential; Future  -     Exercise stress echo with color flow; Future  -     Pregnancy, urine rapid  -     rizatriptan (MAXALT-MLT) 10 MG disintegrating tablet; Take 1 tablet (10 mg total) by mouth as needed for Migraine.        ADD medication/management request sent to PCP

## 2019-07-09 NOTE — TELEPHONE ENCOUNTER
Pt requesting ADD medication. Has not seen you since 2016. Also states has been off of the medication since pregnancy in 2017. Will she need an appt to see you for this or psychiatry for evaluation again?

## 2019-07-10 NOTE — TELEPHONE ENCOUNTER
Please call pt and inform her that I have consulted with her PCP regarding her ADD management. See MD note below. Referral in.

## 2019-07-26 ENCOUNTER — TELEPHONE (OUTPATIENT)
Dept: FAMILY MEDICINE | Facility: CLINIC | Age: 44
End: 2019-07-26

## 2019-07-26 ENCOUNTER — PATIENT MESSAGE (OUTPATIENT)
Dept: FAMILY MEDICINE | Facility: CLINIC | Age: 44
End: 2019-07-26

## 2019-07-26 NOTE — TELEPHONE ENCOUNTER
----- Message from Kylah Murillo MA sent at 7/25/2019  5:33 PM CDT -----  Psych Dept is not accepting new pt's with Medicaid Ins - please consider the following for your referral:    [facility]  [phone]   [facility]  [phone]  [facility]  [phone]  [facility]  [phone]  [facility] [phone]    These [facility] clinics all take Medicaid for Psychiatric services    The order has been removed and referral cancelled.

## 2019-08-01 ENCOUNTER — TELEPHONE (OUTPATIENT)
Dept: FAMILY MEDICINE | Facility: CLINIC | Age: 44
End: 2019-08-01

## 2019-08-01 ENCOUNTER — LAB VISIT (OUTPATIENT)
Dept: LAB | Facility: HOSPITAL | Age: 44
End: 2019-08-01
Attending: NURSE PRACTITIONER
Payer: MEDICAID

## 2019-08-01 ENCOUNTER — TELEPHONE (OUTPATIENT)
Dept: CARDIOLOGY | Facility: CLINIC | Age: 44
End: 2019-08-01

## 2019-08-01 DIAGNOSIS — R53.83 FATIGUE, UNSPECIFIED TYPE: Primary | ICD-10-CM

## 2019-08-01 DIAGNOSIS — Z00.00 ANNUAL PHYSICAL EXAM: ICD-10-CM

## 2019-08-01 DIAGNOSIS — R00.2 PALPITATIONS: ICD-10-CM

## 2019-08-01 DIAGNOSIS — Z82.49 FAMILY HISTORY OF HEART DISEASE: ICD-10-CM

## 2019-08-01 DIAGNOSIS — R53.83 FATIGUE, UNSPECIFIED TYPE: ICD-10-CM

## 2019-08-01 LAB
ALBUMIN SERPL BCP-MCNC: 4 G/DL (ref 3.5–5.2)
ALP SERPL-CCNC: 32 U/L (ref 55–135)
ALT SERPL W/O P-5'-P-CCNC: 12 U/L (ref 10–44)
ANION GAP SERPL CALC-SCNC: 9 MMOL/L (ref 8–16)
AST SERPL-CCNC: 12 U/L (ref 10–40)
BASOPHILS # BLD AUTO: 0.07 K/UL (ref 0–0.2)
BASOPHILS NFR BLD: 2 % (ref 0–1.9)
BILIRUB SERPL-MCNC: 0.7 MG/DL (ref 0.1–1)
BUN SERPL-MCNC: 11 MG/DL (ref 6–20)
CALCIUM SERPL-MCNC: 9.4 MG/DL (ref 8.7–10.5)
CHLORIDE SERPL-SCNC: 105 MMOL/L (ref 95–110)
CHOLEST SERPL-MCNC: 176 MG/DL (ref 120–199)
CHOLEST/HDLC SERPL: 2.8 {RATIO} (ref 2–5)
CO2 SERPL-SCNC: 24 MMOL/L (ref 23–29)
CREAT SERPL-MCNC: 0.7 MG/DL (ref 0.5–1.4)
DIFFERENTIAL METHOD: ABNORMAL
EOSINOPHIL # BLD AUTO: 0.2 K/UL (ref 0–0.5)
EOSINOPHIL NFR BLD: 4.3 % (ref 0–8)
ERYTHROCYTE [DISTWIDTH] IN BLOOD BY AUTOMATED COUNT: 12.9 % (ref 11.5–14.5)
EST. GFR  (AFRICAN AMERICAN): >60 ML/MIN/1.73 M^2
EST. GFR  (NON AFRICAN AMERICAN): >60 ML/MIN/1.73 M^2
GLUCOSE SERPL-MCNC: 79 MG/DL (ref 70–110)
HCT VFR BLD AUTO: 42.6 % (ref 37–48.5)
HDLC SERPL-MCNC: 63 MG/DL (ref 40–75)
HDLC SERPL: 35.8 % (ref 20–50)
HGB BLD-MCNC: 13.2 G/DL (ref 12–16)
IMM GRANULOCYTES # BLD AUTO: 0.01 K/UL (ref 0–0.04)
IMM GRANULOCYTES NFR BLD AUTO: 0.3 % (ref 0–0.5)
LDLC SERPL CALC-MCNC: 103 MG/DL (ref 63–159)
LYMPHOCYTES # BLD AUTO: 1.2 K/UL (ref 1–4.8)
LYMPHOCYTES NFR BLD: 34.5 % (ref 18–48)
MCH RBC QN AUTO: 31 PG (ref 27–31)
MCHC RBC AUTO-ENTMCNC: 31 G/DL (ref 32–36)
MCV RBC AUTO: 100 FL (ref 82–98)
MONOCYTES # BLD AUTO: 0.4 K/UL (ref 0.3–1)
MONOCYTES NFR BLD: 10 % (ref 4–15)
NEUTROPHILS # BLD AUTO: 1.7 K/UL (ref 1.8–7.7)
NEUTROPHILS NFR BLD: 48.9 % (ref 38–73)
NONHDLC SERPL-MCNC: 113 MG/DL
NRBC BLD-RTO: 0 /100 WBC
PLATELET # BLD AUTO: 246 K/UL (ref 150–350)
PMV BLD AUTO: 10.8 FL (ref 9.2–12.9)
POTASSIUM SERPL-SCNC: 4.1 MMOL/L (ref 3.5–5.1)
PROT SERPL-MCNC: 6.9 G/DL (ref 6–8.4)
RBC # BLD AUTO: 4.26 M/UL (ref 4–5.4)
SODIUM SERPL-SCNC: 138 MMOL/L (ref 136–145)
T4 FREE SERPL-MCNC: 1 NG/DL (ref 0.71–1.51)
TRIGL SERPL-MCNC: 50 MG/DL (ref 30–150)
TSH SERPL DL<=0.005 MIU/L-ACNC: 0.34 UIU/ML (ref 0.4–4)
WBC # BLD AUTO: 3.51 K/UL (ref 3.9–12.7)

## 2019-08-01 PROCEDURE — 84439 ASSAY OF FREE THYROXINE: CPT

## 2019-08-01 PROCEDURE — 36415 COLL VENOUS BLD VENIPUNCTURE: CPT | Mod: PO

## 2019-08-01 PROCEDURE — 85025 COMPLETE CBC W/AUTO DIFF WBC: CPT

## 2019-08-01 PROCEDURE — 80061 LIPID PANEL: CPT

## 2019-08-01 PROCEDURE — 80053 COMPREHEN METABOLIC PANEL: CPT

## 2019-08-01 PROCEDURE — 84443 ASSAY THYROID STIM HORMONE: CPT

## 2019-08-01 NOTE — TELEPHONE ENCOUNTER
Peer to peer done. Stress echo denied, however provider stated treadmill stress test requires no prior authorization and would be approved. Order for treadmill stress test in. Ref#[id number] for stress treadmill test and needs to be worked. Please.

## 2019-08-01 NOTE — TELEPHONE ENCOUNTER
Peer to peer done. Stress echo denied, however provider stated treadmill stress test requires no prior authorization and would be approved. Order for treadmill stress test in.

## 2019-08-01 NOTE — TELEPHONE ENCOUNTER
Kwadwo Felton NP; ANIA Hamm Staff             Good Morning,   Peer to Peer Discussion is required to process authorization request because the clinic notes provided is not meeting insurance medical criteria. Please contact [facility] at [phone], option#3 and reference case#[id number]. If MD is unable to complete discussion, any other MD, PA or NP can do it.     Thanks,   Kwadwo Coleman   [id number]

## 2019-08-01 NOTE — TELEPHONE ENCOUNTER
Sent referral message to pre-service and to Josesito Coleman in pre-service. Awaiting approval. Pt is informed that new test is pending approval and we will call and scheduled once complete.

## 2019-08-01 NOTE — TELEPHONE ENCOUNTER
The patient arrived for her stress echo and checked with preservice and its was still not approved and note was read that recommended rescheduling until referral completed.The order was in on 7/9/19.I called preservice and asked the staff that was working referral to contact me so I can assist with referral auth if needed.Message to provider.Patient notified.Test to be rescheduled.

## 2019-08-02 ENCOUNTER — PATIENT MESSAGE (OUTPATIENT)
Dept: FAMILY MEDICINE | Facility: CLINIC | Age: 44
End: 2019-08-02

## 2019-08-02 ENCOUNTER — TELEPHONE (OUTPATIENT)
Dept: FAMILY MEDICINE | Facility: CLINIC | Age: 44
End: 2019-08-02

## 2019-08-02 DIAGNOSIS — R79.89 ABNORMAL CBC: Primary | ICD-10-CM

## 2019-08-02 NOTE — TELEPHONE ENCOUNTER
Patient given lab results, phone went dead when I was trying to tell her to ask pharmacist how much B12 and folate she should take unless you have suggestion and I will call her back

## 2019-08-02 NOTE — TELEPHONE ENCOUNTER
Informed pt that provider wanted her to complete lab work for b-12 and folate not take supplement for them.Pt verbalized understanding and lab order is pending. Pt will follow up with endocrinology.

## 2019-08-02 NOTE — TELEPHONE ENCOUNTER
----- Message from Rema Boone sent at 8/2/2019  3:18 PM CDT -----  Contact: pt   Type:  Patient Returning Call    Who Called:Vijaya Redding  Who Left Message for Patient:unsure  Does the patient know what this is regarding?:unsure  Would the patient rather a call back or a response via [facility]? call  Best Call Back Number:[phone]  Additional Information: n/a

## 2019-08-07 ENCOUNTER — PATIENT MESSAGE (OUTPATIENT)
Dept: FAMILY MEDICINE | Facility: CLINIC | Age: 44
End: 2019-08-07

## 2019-08-08 ENCOUNTER — LAB VISIT (OUTPATIENT)
Dept: LAB | Facility: HOSPITAL | Age: 44
End: 2019-08-08
Attending: NURSE PRACTITIONER
Payer: MEDICAID

## 2019-08-08 DIAGNOSIS — R79.89 ABNORMAL CBC: ICD-10-CM

## 2019-08-08 LAB
FOLATE SERPL-MCNC: 10.6 NG/ML (ref 4–24)
VIT B12 SERPL-MCNC: 432 PG/ML (ref 210–950)

## 2019-08-08 PROCEDURE — 36415 COLL VENOUS BLD VENIPUNCTURE: CPT | Mod: PO

## 2019-08-08 PROCEDURE — 82607 VITAMIN B-12: CPT

## 2019-08-08 PROCEDURE — 82746 ASSAY OF FOLIC ACID SERUM: CPT

## 2019-08-09 ENCOUNTER — PATIENT MESSAGE (OUTPATIENT)
Dept: FAMILY MEDICINE | Facility: CLINIC | Age: 44
End: 2019-08-09

## 2019-10-31 ENCOUNTER — PATIENT MESSAGE (OUTPATIENT)
Dept: FAMILY MEDICINE | Facility: CLINIC | Age: 44
End: 2019-10-31

## 2019-10-31 DIAGNOSIS — R51.9 INTRACTABLE EPISODIC HEADACHE, UNSPECIFIED HEADACHE TYPE: ICD-10-CM

## 2019-10-31 RX ORDER — RIZATRIPTAN BENZOATE 10 MG/1
TABLET, ORALLY DISINTEGRATING ORAL
Qty: 9 TABLET | Refills: 1 | Status: SHIPPED | OUTPATIENT
Start: 2019-10-31 | End: 2019-12-21

## 2019-10-31 RX ORDER — RIZATRIPTAN BENZOATE 10 MG/1
10 TABLET, ORALLY DISINTEGRATING ORAL
Qty: 9 TABLET | Refills: 1 | OUTPATIENT
Start: 2019-10-31

## 2019-11-01 ENCOUNTER — TELEPHONE (OUTPATIENT)
Dept: FAMILY MEDICINE | Facility: CLINIC | Age: 44
End: 2019-11-01

## 2019-11-01 ENCOUNTER — OFFICE VISIT (OUTPATIENT)
Dept: FAMILY MEDICINE | Facility: CLINIC | Age: 44
End: 2019-11-01
Payer: MEDICAID

## 2019-11-01 VITALS
HEART RATE: 94 BPM | TEMPERATURE: 98 F | SYSTOLIC BLOOD PRESSURE: 119 MMHG | DIASTOLIC BLOOD PRESSURE: 63 MMHG | BODY MASS INDEX: 27.82 KG/M2 | HEIGHT: 65 IN | WEIGHT: 167 LBS

## 2019-11-01 DIAGNOSIS — J06.9 UPPER RESPIRATORY TRACT INFECTION, UNSPECIFIED TYPE: ICD-10-CM

## 2019-11-01 DIAGNOSIS — J04.0 LARYNGITIS: ICD-10-CM

## 2019-11-01 DIAGNOSIS — J02.9 PHARYNGITIS, UNSPECIFIED ETIOLOGY: Primary | ICD-10-CM

## 2019-11-01 LAB — DEPRECATED S PYO AG THROAT QL EIA: NEGATIVE

## 2019-11-01 PROCEDURE — 99213 OFFICE O/P EST LOW 20 MIN: CPT | Mod: PBBFAC,PO | Performed by: NURSE PRACTITIONER

## 2019-11-01 PROCEDURE — 99999 PR PBB SHADOW E&M-EST. PATIENT-LVL III: ICD-10-PCS | Mod: PBBFAC,,, | Performed by: NURSE PRACTITIONER

## 2019-11-01 PROCEDURE — 99213 PR OFFICE/OUTPT VISIT, EST, LEVL III, 20-29 MIN: ICD-10-PCS | Mod: S$PBB,,, | Performed by: NURSE PRACTITIONER

## 2019-11-01 PROCEDURE — 99999 PR PBB SHADOW E&M-EST. PATIENT-LVL III: CPT | Mod: PBBFAC,,, | Performed by: NURSE PRACTITIONER

## 2019-11-01 PROCEDURE — 87081 CULTURE SCREEN ONLY: CPT

## 2019-11-01 PROCEDURE — 87880 STREP A ASSAY W/OPTIC: CPT | Mod: PO

## 2019-11-01 PROCEDURE — 99213 OFFICE O/P EST LOW 20 MIN: CPT | Mod: S$PBB,,, | Performed by: NURSE PRACTITIONER

## 2019-11-01 RX ORDER — DEXAMETHASONE SODIUM PHOSPHATE 4 MG/ML
8 INJECTION, SOLUTION INTRA-ARTICULAR; INTRALESIONAL; INTRAMUSCULAR; INTRAVENOUS; SOFT TISSUE
Status: COMPLETED | OUTPATIENT
Start: 2019-11-01 | End: 2019-11-01

## 2019-11-01 RX ORDER — AZITHROMYCIN 250 MG/1
TABLET, FILM COATED ORAL
Qty: 6 TABLET | Refills: 0 | Status: SHIPPED | OUTPATIENT
Start: 2019-11-01 | End: 2019-11-06

## 2019-11-01 RX ADMIN — DEXAMETHASONE SODIUM PHOSPHATE 8 MG: 4 INJECTION, SOLUTION INTRAMUSCULAR; INTRAVENOUS at 02:11

## 2019-11-01 NOTE — PROGRESS NOTES
Subjective:       Patient ID: Vijaya Redding is a 44 y.o. female.    Chief Complaint: Sore Throat; Cough; Nasal Congestion; and Laryngitis    Sore Throat    This is a new problem. The current episode started in the past 7 days. The problem has been unchanged. There has been no fever. The pain is moderate. Associated symptoms include congestion, coughing and a hoarse voice. Pertinent negatives include no abdominal pain, diarrhea, drooling, ear discharge, ear pain, headaches, plugged ear sensation, neck pain, shortness of breath, stridor, swollen glands, trouble swallowing or vomiting. She has had no exposure to strep or mono. She has tried nothing for the symptoms. The treatment provided no relief.     Past Medical History:   Diagnosis Date    Abnormal Pap smear     repeat pap    Herpes genitalia     Migraines      Social History     Socioeconomic History    Marital status:      Spouse name: Not on file    Number of children: 2    Years of education: Not on file    Highest education level: Not on file   Occupational History     Employer: [employer]   Social Needs    Financial resource strain: Not on file    Food insecurity:     Worry: Not on file     Inability: Not on file    Transportation needs:     Medical: Not on file     Non-medical: Not on file   Tobacco Use    Smoking status: Former Smoker    Smokeless tobacco: Never Used    Tobacco comment: quit tobacco 20 years ago   Substance and Sexual Activity    Alcohol use: Yes     Comment: socially    Drug use: No    Sexual activity: Yes     Partners: Male   Lifestyle    Physical activity:     Days per week: Not on file     Minutes per session: Not on file    Stress: Not on file   Relationships    Social connections:     Talks on phone: Not on file     Gets together: Not on file     Attends Jain service: Not on file     Active member of club or organization: Not on file     Attends meetings of clubs or organizations: Not on  file     Relationship status: Not on file   Other Topics Concern    Not on file   Social History Narrative    Not on file     Past Surgical History:   Procedure Laterality Date    breast implants  2001    dx lap      laparoscope      TONSILLECTOMY         Review of Systems   Constitutional: Negative.    HENT: Positive for congestion, hoarse voice and sore throat. Negative for drooling, ear discharge, ear pain and trouble swallowing.    Eyes: Negative.    Respiratory: Positive for cough. Negative for shortness of breath and stridor.    Cardiovascular: Negative.    Gastrointestinal: Negative.  Negative for abdominal pain, diarrhea and vomiting.   Endocrine: Negative.    Genitourinary: Negative.    Musculoskeletal: Negative.  Negative for neck pain.   Skin: Negative.    Allergic/Immunologic: Negative.    Neurological: Negative.  Negative for headaches.   Psychiatric/Behavioral: Negative.        Objective:      Physical Exam   Constitutional: She is oriented to person, place, and time. She appears well-developed and well-nourished.   HENT:   Head: Normocephalic.   Right Ear: External ear normal.   Left Ear: External ear normal.   Nose: Nose normal.   Mouth/Throat: Oropharynx is clear and moist.   Eyes: Pupils are equal, round, and reactive to light. Conjunctivae are normal.   Neck: Normal range of motion. Neck supple.   Cardiovascular: Normal rate, regular rhythm and normal heart sounds.   Pulmonary/Chest: Effort normal and breath sounds normal.   Abdominal: Soft. Bowel sounds are normal.   Musculoskeletal: Normal range of motion.   Neurological: She is alert and oriented to person, place, and time.   Skin: Skin is warm and dry. Capillary refill takes 2 to 3 seconds.   Psychiatric: She has a normal mood and affect. Her behavior is normal. Judgment and thought content normal.   Nursing note and vitals reviewed.      Assessment:       1.Upper respiratory tract infection, unspecified type  2. Laryngitis  3.  Pharyngitis, unspecified etiology      Plan:           Vijaya was seen today for sore throat, cough, nasal congestion and laryngitis.    Diagnoses and all orders for this visit:    Upper respiratory tract infection, unspecified type  Laryngitis  Pharyngitis, unspecified etiology  -     Throat Screen, Rapid  -     (Magic mouthwash) 1:1:1 Benadryl 12.5mg/5ml liq, aluminum & magnesium hydroxide-simehticone (Maalox), lidocaine viscous 2%; Swish and spit 5 mLs every 8 (eight) hours as needed. Gargle and spit. DO NOT SWALLOW.  -     dexamethasone injection 8 mg  -     Strep A culture, throat  -     azithromycin (Z-BERTIN) 250 MG tablet; Take 2 tablets by mouth on day 1; Take 1 tablet by mouth on days 2-5  Motrin OTC as directed  Warm salt water gargles PRN  Report to ER immediately if symptoms worsen

## 2019-11-01 NOTE — TELEPHONE ENCOUNTER
----- Message from Sonali Rivero sent at 11/1/2019  8:37 AM CDT -----  Contact: pt  Type:  Same Day Appointment Request    Caller is requesting a same day appointment.  Caller declined first available appointment listed below.    Name of Caller:Patient  When is the first available appointment?12/17  Symptoms:congestion,cold,cough,sore throat  Best Call Back Number:[phone]  Additional Information: no earlier appt, pt is medicaid

## 2019-11-03 LAB — BACTERIA THROAT CULT: NORMAL

## 2019-11-04 ENCOUNTER — PATIENT MESSAGE (OUTPATIENT)
Dept: FAMILY MEDICINE | Facility: CLINIC | Age: 44
End: 2019-11-04

## 2019-11-12 ENCOUNTER — PATIENT MESSAGE (OUTPATIENT)
Dept: FAMILY MEDICINE | Facility: CLINIC | Age: 44
End: 2019-11-12

## 2019-11-13 ENCOUNTER — TELEPHONE (OUTPATIENT)
Dept: FAMILY MEDICINE | Facility: CLINIC | Age: 44
End: 2019-11-13

## 2019-11-13 RX ORDER — AMOXICILLIN AND CLAVULANATE POTASSIUM 875; 125 MG/1; MG/1
1 TABLET, FILM COATED ORAL EVERY 12 HOURS
Qty: 20 TABLET | Refills: 0 | Status: SHIPPED | OUTPATIENT
Start: 2019-11-13 | End: 2019-11-23

## 2019-11-13 NOTE — TELEPHONE ENCOUNTER
----- Message from Shavonne Maxwell sent at 11/13/2019  2:40 PM CST -----  Contact: self  states that she's not feeling better although finished with medication..[phone] (home)

## 2019-12-20 DIAGNOSIS — R51.9 INTRACTABLE EPISODIC HEADACHE, UNSPECIFIED HEADACHE TYPE: ICD-10-CM

## 2019-12-21 RX ORDER — RIZATRIPTAN BENZOATE 10 MG/1
TABLET, ORALLY DISINTEGRATING ORAL
Qty: 9 TABLET | Refills: 3 | Status: SHIPPED | OUTPATIENT
Start: 2019-12-21 | End: 2020-03-09

## 2020-01-13 ENCOUNTER — PATIENT MESSAGE (OUTPATIENT)
Dept: FAMILY MEDICINE | Facility: CLINIC | Age: 45
End: 2020-01-13

## 2020-02-06 ENCOUNTER — PATIENT MESSAGE (OUTPATIENT)
Dept: FAMILY MEDICINE | Facility: CLINIC | Age: 45
End: 2020-02-06

## 2020-02-06 RX ORDER — VALACYCLOVIR HYDROCHLORIDE 1 G/1
1000 TABLET, FILM COATED ORAL 2 TIMES DAILY
Qty: 60 TABLET | Refills: 1 | Status: SHIPPED | OUTPATIENT
Start: 2020-02-06 | End: 2021-09-08

## 2020-03-09 DIAGNOSIS — R51.9 INTRACTABLE EPISODIC HEADACHE, UNSPECIFIED HEADACHE TYPE: ICD-10-CM

## 2020-03-09 RX ORDER — RIZATRIPTAN BENZOATE 10 MG/1
TABLET, ORALLY DISINTEGRATING ORAL
Qty: 9 TABLET | Refills: 0 | Status: SHIPPED | OUTPATIENT
Start: 2020-03-09 | End: 2020-05-11 | Stop reason: SDUPTHER

## 2020-03-11 ENCOUNTER — PATIENT MESSAGE (OUTPATIENT)
Dept: FAMILY MEDICINE | Facility: CLINIC | Age: 45
End: 2020-03-11

## 2020-03-11 NOTE — TELEPHONE ENCOUNTER
MEETiiN message sent   [facility],   [facility]  [address]  [place]  Phone: [phone]  Fax: (   )    -  Follow Up Time:

## 2020-05-11 DIAGNOSIS — R51.9 INTRACTABLE EPISODIC HEADACHE, UNSPECIFIED HEADACHE TYPE: ICD-10-CM

## 2020-05-11 RX ORDER — RIZATRIPTAN BENZOATE 10 MG/1
TABLET, ORALLY DISINTEGRATING ORAL
Refills: 0 | OUTPATIENT
Start: 2020-05-11

## 2020-05-11 RX ORDER — RIZATRIPTAN BENZOATE 10 MG/1
10 TABLET, ORALLY DISINTEGRATING ORAL
Qty: 9 TABLET | Refills: 1 | Status: SHIPPED | OUTPATIENT
Start: 2020-05-11 | End: 2020-06-08 | Stop reason: SDUPTHER

## 2020-06-08 ENCOUNTER — TELEPHONE (OUTPATIENT)
Dept: FAMILY MEDICINE | Facility: CLINIC | Age: 45
End: 2020-06-08

## 2020-06-08 ENCOUNTER — OFFICE VISIT (OUTPATIENT)
Dept: FAMILY MEDICINE | Facility: CLINIC | Age: 45
End: 2020-06-08
Payer: MEDICAID

## 2020-06-08 DIAGNOSIS — Z13.6 ENCOUNTER FOR LIPID SCREENING FOR CARDIOVASCULAR DISEASE: ICD-10-CM

## 2020-06-08 DIAGNOSIS — F98.8 ATTENTION DEFICIT DISORDER, UNSPECIFIED HYPERACTIVITY PRESENCE: ICD-10-CM

## 2020-06-08 DIAGNOSIS — E05.90 SUBCLINICAL HYPERTHYROIDISM: ICD-10-CM

## 2020-06-08 DIAGNOSIS — Z13.220 ENCOUNTER FOR LIPID SCREENING FOR CARDIOVASCULAR DISEASE: ICD-10-CM

## 2020-06-08 DIAGNOSIS — R51.9 INTRACTABLE EPISODIC HEADACHE, UNSPECIFIED HEADACHE TYPE: ICD-10-CM

## 2020-06-08 DIAGNOSIS — Z00.00 ENCOUNTER FOR ANNUAL HEALTH EXAMINATION: ICD-10-CM

## 2020-06-08 DIAGNOSIS — G43.009 MIGRAINE WITHOUT AURA AND WITHOUT STATUS MIGRAINOSUS, NOT INTRACTABLE: Primary | ICD-10-CM

## 2020-06-08 PROCEDURE — 99214 PR OFFICE/OUTPT VISIT, EST, LEVL IV, 30-39 MIN: ICD-10-PCS | Mod: 95,,, | Performed by: INTERNAL MEDICINE

## 2020-06-08 PROCEDURE — 99214 OFFICE O/P EST MOD 30 MIN: CPT | Mod: 95,,, | Performed by: INTERNAL MEDICINE

## 2020-06-08 RX ORDER — DEXTROAMPHETAMINE SACCHARATE, AMPHETAMINE ASPARTATE MONOHYDRATE, DEXTROAMPHETAMINE SULFATE AND AMPHETAMINE SULFATE 3.75; 3.75; 3.75; 3.75 MG/1; MG/1; MG/1; MG/1
15 CAPSULE, EXTENDED RELEASE ORAL EVERY MORNING
COMMUNITY
End: 2022-11-04

## 2020-06-08 RX ORDER — RIZATRIPTAN BENZOATE 10 MG/1
10 TABLET, ORALLY DISINTEGRATING ORAL
Qty: 9 TABLET | Refills: 1 | Status: SHIPPED | OUTPATIENT
Start: 2020-06-08 | End: 2020-07-16

## 2020-06-08 NOTE — ASSESSMENT & PLAN NOTE
-followed by psychiatry, Dr. Reyes  -started on Adderall 15 mg daily as needed  -denies adverse effects of medication

## 2020-06-08 NOTE — TELEPHONE ENCOUNTER
----- Message from Vilma Lemus MD sent at 6/8/2020 10:09 AM CDT -----  Referral placed to pain management  Please schedule labs in early August- cbc, cmp, lipid, tsh, t4, t3, tsi, thyrotropin ab

## 2020-06-08 NOTE — PROGRESS NOTES
Primary Care Telemedicine Note  The patient location is:  Patient Home   The chief complaint leading to consultation is:  Establish care  Total time spent with patient:  20 min    Visit type: Virtual visit with synchronous audio only and video  Each patient to whom he or she provides medical services by telemedicine is:  (1) informed of the relationship between the physician and patient and the respective role of any other health care provider with respect to management of the patient; and (2) notified that he or she may decline to receive medical services by telemedicine and may withdraw from such care at any time.      Assessment/Plan:    Migraine  -diagnosed >15 years ago  -worsening in frequency  -known cervical DDD, diagnosed via MRI in 2014  -using Maxalt PRN   -trial of topamax recently but experienced adverse effects without much improvement of headaches  -requesting referral to Dr. Bijal Silva with pain management for further treatment  -does reports worsening severity and nocturnal occurrence. No MRI of brain in chart (patient reports done at time of diagnosis). I would recommend that this be repeated but will have her follow up with pain management. If not able to get appt soon, will go ahead and order    ADD (attention deficit disorder)  -followed by psychiatry, Dr. Reyes  -started on Adderall 15 mg daily as needed  -denies adverse effects of medication    Subclinical hyperthyroidism  -evaluated by Endocrinology in the past  -last TSH slightly low, normal T4  -denies symptoms  -patient would like to hold off follow-up for endocrinology follow-up  -discussed rechecking thyroid studies and will decide at that time if patient should be seen by endo    _____________________________________________________________________________________________________________________________________________________    Orders this visit:    Migraine without aura and without status migrainosus, not intractable  -     Ambulatory  referral/consult to Pain Clinic; Future; Expected date: 06/15/2020    Attention deficit disorder, unspecified hyperactivity presence    Intractable episodic headache, unspecified headache type  -     rizatriptan (MAXALT-MLT) 10 MG disintegrating tablet; Take 1 tablet (10 mg total) by mouth as needed for Migraine. May repeat in 2 hours if needed  Dispense: 9 tablet; Refill: 1    Subclinical hyperthyroidism  -     TSH; Future; Expected date: 06/08/2020  -     T4, free; Future; Expected date: 06/08/2020  -     T3; Future; Expected date: 06/08/2020  -     THYROID STIMULATING IMMUNOGLOBULIN; Future; Expected date: 06/08/2020  -     THYROTROPIN RECEPTOR ANTIBODY; Future; Expected date: 06/08/2020    Encounter for annual health examination  -     Comprehensive metabolic panel; Standing  -     Lipid Panel; Standing  -     CBC auto differential; Future; Expected date: 06/08/2020    Encounter for lipid screening for cardiovascular disease  -     Lipid Panel; Standing      Follow up in about 6 months (around 12/8/2020), or if symptoms worsen or fail to improve.    Vilma Lemus MD  _____________________________________________________________________________________________________________________________________________________    CC:  Establish care    HPI:  Patient is an established patient of clinic, new to me, who presents today via virtual visit to establish care.  Patient is a 44-year-old  female with a past medical history of ADD,migraine headaches, cervical degenerative disc disease, and subclinical hyperthyroidism.    Headaches:  Patient diagnosed with migraines 17 years ago.  Becoming worse in frequency. Occur at least twice monthly. Headaches located diffusely in head, usually frontal or temporal. Sometimes better with massaging neck. Does have a hx of DDD of c-spine, diagnosed by MRI in 2014.  Has never undergone evaluation or treatment for this.  Started topamax which was given to her by psychiatry. At  first, she felt that it was helping decrease frequency, however then headaches return.  She took a dose of Maxalt while on the Topamax and had adverse effects, reporting that she felt very sedated and unable to complete daily tasks.  After that experience, she stopped taking the Topamax.  Headaches have since returned.  Patient is interested in being evaluated by Pain Management, Dr. Bijal Silva.  She does report a change in her headache occurrence, high sometimes occurring in the middle the night.  Patient reports that this has been having at least for the past year.  It has been documented back in 2014 as well.  I do not see an MRI of her brain on the chart.  She does report having an MRI of brain when she was 1st diagnosed, however none since that time.  We discussed that given the increased frequency and the nocturnal occurrence, she should have another MRI.    Subclinical hyperthyroidism:  Family history of thyroid disease.  Mother has a history of goiter, sister with hypothyroidism.  Patient found to have low TSH with normal T4 several years ago.  She was pregnant during this time.  She was evaluated by Endocrinology at that time.  Negative antibodies.  No new recommendation for treatment.  Recommendation to follow-up thyroid studies, however patient never followed up with Endocrinology.  Last TSH was checked last year, was slightly low at that time.  Normal T4.  She has not had repeat thyroid studies since then.  Patient denies any symptoms of heat intolerance, palpitations, diarrhea.  Denies any enlargement in her neck region.  Denies dysphagia.  Reports cold intolerance and symptoms fatigue.    ADD:  Followed by Psychiatry.  Recently started on Adderall 15 mg daily, which she states she only uses as needed.  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  Denies any adverse effects of her medications.    Patient with no other complaints today.  Routine health maintenance reviewed.  Due for  annual labs in August.  Also due for Pap smear which she will follow-up with her gyn for.      Past Medical History:  Past Medical History:   Diagnosis Date    Abnormal Pap smear     repeat pap    Herpes genitalia     Migraines      Past Surgical History:   Procedure Laterality Date    breast implants  2001    dx lap      endometriosis    TONSILLECTOMY       Review of patient's allergies indicates:   Allergen Reactions    Sulfa (sulfonamide antibiotics)      Social History     Tobacco Use    Smoking status: Never Smoker    Smokeless tobacco: Never Used   Substance Use Topics    Alcohol use: Yes     Comment: socially    Drug use: No     Family History   Problem Relation Age of Onset    Osteoporosis Mother     Arthritis Mother     Goiter Mother     Cancer Paternal Aunt         unknown GYN in origin    Ovarian cancer Paternal Aunt     Arthritis Maternal Grandmother     Hypothyroidism Sister     Thyroid cancer Neg Hx      Current Outpatient Medications on File Prior to Visit   Medication Sig Dispense Refill    dextroamphetamine-amphetamine (ADDERALL XR) 15 MG 24 hr capsule Take 15 mg by mouth every morning.      valACYclovir (VALTREX) 1000 MG tablet Take 1 tablet (1,000 mg total) by mouth 2 (two) times daily. as directed. 60 tablet 1    [DISCONTINUED] rizatriptan (MAXALT-MLT) 10 MG disintegrating tablet Take 1 tablet (10 mg total) by mouth as needed for Migraine. May repeat in 2 hours if needed 9 tablet 1    [DISCONTINUED] EVELIO-BE 0.35 mg tablet Take 1 tablet by mouth once daily.  11     No current facility-administered medications on file prior to visit.        Review of Systems   HENT: Negative for hearing loss.    Eyes: Negative for discharge.   Respiratory: Negative for wheezing.    Cardiovascular: Negative for chest pain and palpitations.   Gastrointestinal: Negative for blood in stool, constipation, diarrhea and vomiting.   Genitourinary: Negative for dysuria and hematuria.    Musculoskeletal: Positive for neck pain.   Neurological: Positive for headaches. Negative for weakness.         Physical Exam   No flowsheet data found.  No flowsheet data found.      No flowsheet data found.    Physical Exam   Constitutional: She is oriented to person, place, and time. She appears well-developed and well-nourished. No distress.   HENT:   Head: Normocephalic and atraumatic.   Eyes: EOM are normal.   Neck: Normal range of motion. No thyromegaly present.   Pulmonary/Chest: Effort normal. No respiratory distress.   Neurological: She is alert and oriented to person, place, and time.   Psychiatric: She has a normal mood and affect. Her behavior is normal.       The patient's Health Maintenance was reviewed and the following appears to be due at this time:  Health Maintenance Due   Topic Date Due    TETANUS VACCINE  09/26/1994    Pap Smear with HPV Cotest  03/01/2020         Answers for HPI/ROS submitted by the patient on 6/8/2020   activity change: No  unexpected weight change: No  rhinorrhea: No  trouble swallowing: No  visual disturbance: No  chest tightness: No  difficulty urinating: No  menstrual problem: No  joint swelling: No  arthralgias: No  confusion: No  dysphoric mood: No

## 2020-06-08 NOTE — TELEPHONE ENCOUNTER
Scheduled lab appt. Left vm for pt to call pain management to schedule appt due to restrictions with ins.

## 2020-06-08 NOTE — ASSESSMENT & PLAN NOTE
-evaluated by Endocrinology in the past  -last TSH slightly low, normal T4  -denies symptoms  -patient would like to hold off follow-up for endocrinology follow-up  -discussed rechecking thyroid studies and will decide at that time if patient should be seen by endo

## 2020-06-08 NOTE — Clinical Note
Referral placed to pain managementPlease schedule labs in early August- cbc, cmp, lipid, tsh, t4, t3, tsi, thyrotropin ab

## 2020-06-08 NOTE — ASSESSMENT & PLAN NOTE
-diagnosed >15 years ago  -worsening in frequency  -known cervical DDD, diagnosed via MRI in 2014  -using Maxalt PRN   -trial of topamax recently but experienced adverse effects without much improvement of headaches  -requesting referral to Dr. Bijal Silva with pain management for further treatment  -does reports worsening severity and nocturnal occurrence. No MRI of brain in chart (patient reports done at time of diagnosis). I would recommend that this be repeated but will have her follow up with pain management. If not able to get appt soon, will go ahead and order

## 2020-07-17 ENCOUNTER — LAB VISIT (OUTPATIENT)
Dept: LAB | Facility: HOSPITAL | Age: 45
End: 2020-07-17
Attending: FAMILY MEDICINE
Payer: MEDICAID

## 2020-07-17 DIAGNOSIS — Z00.00 ENCOUNTER FOR ANNUAL HEALTH EXAMINATION: ICD-10-CM

## 2020-07-17 DIAGNOSIS — Z01.84 ENCOUNTER FOR ANTIBODY RESPONSE EXAMINATION: ICD-10-CM

## 2020-07-17 DIAGNOSIS — E05.90 SUBCLINICAL HYPERTHYROIDISM: ICD-10-CM

## 2020-07-17 DIAGNOSIS — Z13.6 ENCOUNTER FOR LIPID SCREENING FOR CARDIOVASCULAR DISEASE: ICD-10-CM

## 2020-07-17 DIAGNOSIS — Z13.220 ENCOUNTER FOR LIPID SCREENING FOR CARDIOVASCULAR DISEASE: ICD-10-CM

## 2020-07-17 LAB
ALBUMIN SERPL BCP-MCNC: 4.1 G/DL (ref 3.5–5.2)
ALP SERPL-CCNC: 36 U/L (ref 55–135)
ALT SERPL W/O P-5'-P-CCNC: 12 U/L (ref 10–44)
ANION GAP SERPL CALC-SCNC: 6 MMOL/L (ref 8–16)
AST SERPL-CCNC: 10 U/L (ref 10–40)
BASOPHILS # BLD AUTO: 0.04 K/UL (ref 0–0.2)
BASOPHILS NFR BLD: 1 % (ref 0–1.9)
BILIRUB SERPL-MCNC: 0.7 MG/DL (ref 0.1–1)
BUN SERPL-MCNC: 12 MG/DL (ref 6–20)
CALCIUM SERPL-MCNC: 9.1 MG/DL (ref 8.7–10.5)
CHLORIDE SERPL-SCNC: 105 MMOL/L (ref 95–110)
CHOLEST SERPL-MCNC: 189 MG/DL (ref 120–199)
CHOLEST/HDLC SERPL: 3.2 {RATIO} (ref 2–5)
CO2 SERPL-SCNC: 28 MMOL/L (ref 23–29)
CREAT SERPL-MCNC: 0.8 MG/DL (ref 0.5–1.4)
DIFFERENTIAL METHOD: ABNORMAL
EOSINOPHIL # BLD AUTO: 0.1 K/UL (ref 0–0.5)
EOSINOPHIL NFR BLD: 2.8 % (ref 0–8)
ERYTHROCYTE [DISTWIDTH] IN BLOOD BY AUTOMATED COUNT: 12.9 % (ref 11.5–14.5)
EST. GFR  (AFRICAN AMERICAN): >60 ML/MIN/1.73 M^2
EST. GFR  (NON AFRICAN AMERICAN): >60 ML/MIN/1.73 M^2
GLUCOSE SERPL-MCNC: 91 MG/DL (ref 70–110)
HCT VFR BLD AUTO: 41.6 % (ref 37–48.5)
HDLC SERPL-MCNC: 60 MG/DL (ref 40–75)
HDLC SERPL: 31.7 % (ref 20–50)
HGB BLD-MCNC: 13.2 G/DL (ref 12–16)
IMM GRANULOCYTES # BLD AUTO: 0.01 K/UL (ref 0–0.04)
IMM GRANULOCYTES NFR BLD AUTO: 0.3 % (ref 0–0.5)
LDLC SERPL CALC-MCNC: 116.6 MG/DL (ref 63–159)
LYMPHOCYTES # BLD AUTO: 1.2 K/UL (ref 1–4.8)
LYMPHOCYTES NFR BLD: 30.2 % (ref 18–48)
MCH RBC QN AUTO: 30.3 PG (ref 27–31)
MCHC RBC AUTO-ENTMCNC: 31.7 G/DL (ref 32–36)
MCV RBC AUTO: 95 FL (ref 82–98)
MONOCYTES # BLD AUTO: 0.4 K/UL (ref 0.3–1)
MONOCYTES NFR BLD: 9 % (ref 4–15)
NEUTROPHILS # BLD AUTO: 2.3 K/UL (ref 1.8–7.7)
NEUTROPHILS NFR BLD: 56.7 % (ref 38–73)
NONHDLC SERPL-MCNC: 129 MG/DL
NRBC BLD-RTO: 0 /100 WBC
PLATELET # BLD AUTO: 307 K/UL (ref 150–350)
PMV BLD AUTO: 10.9 FL (ref 9.2–12.9)
POTASSIUM SERPL-SCNC: 3.9 MMOL/L (ref 3.5–5.1)
PROT SERPL-MCNC: 6.8 G/DL (ref 6–8.4)
RBC # BLD AUTO: 4.36 M/UL (ref 4–5.4)
SODIUM SERPL-SCNC: 139 MMOL/L (ref 136–145)
T3 SERPL-MCNC: 115 NG/DL (ref 60–180)
T4 FREE SERPL-MCNC: 1.03 NG/DL (ref 0.71–1.51)
TRIGL SERPL-MCNC: 62 MG/DL (ref 30–150)
TSH SERPL DL<=0.005 MIU/L-ACNC: 0.33 UIU/ML (ref 0.4–4)
WBC # BLD AUTO: 3.98 K/UL (ref 3.9–12.7)

## 2020-07-17 PROCEDURE — 80061 LIPID PANEL: CPT

## 2020-07-17 PROCEDURE — 86769 SARS-COV-2 COVID-19 ANTIBODY: CPT

## 2020-07-17 PROCEDURE — 84443 ASSAY THYROID STIM HORMONE: CPT

## 2020-07-17 PROCEDURE — 80053 COMPREHEN METABOLIC PANEL: CPT

## 2020-07-17 PROCEDURE — 85025 COMPLETE CBC W/AUTO DIFF WBC: CPT

## 2020-07-17 PROCEDURE — 84480 ASSAY TRIIODOTHYRONINE (T3): CPT

## 2020-07-17 PROCEDURE — 36415 COLL VENOUS BLD VENIPUNCTURE: CPT | Mod: PO

## 2020-07-17 PROCEDURE — 83520 IMMUNOASSAY QUANT NOS NONAB: CPT

## 2020-07-17 PROCEDURE — 84439 ASSAY OF FREE THYROXINE: CPT

## 2020-07-17 PROCEDURE — 84445 ASSAY OF TSI GLOBULIN: CPT

## 2020-07-18 LAB — SARS-COV-2 IGG SERPLBLD QL IA.RAPID: NEGATIVE

## 2020-07-19 NOTE — PROGRESS NOTES
Your test was NEGATIVE for COVID-19 (coronavirus).  If you still have symptoms, treat with rest, fluids, and over-the-counter medications.  Continue to follow local guidelines and practice proper handwashing.     If your symptoms worsen or if you have any other concerns, please contact [facility] at [phone].     Sincerely,    Nolan Palmer MD

## 2020-07-20 LAB
TSH RECEP AB SER-ACNC: <1 IU/L (ref 0–1.75)
TSI SER-ACNC: <0.1 IU/L

## 2020-12-14 ENCOUNTER — CLINICAL SUPPORT (OUTPATIENT)
Dept: FAMILY MEDICINE | Facility: CLINIC | Age: 45
End: 2020-12-14
Payer: MEDICAID

## 2020-12-14 ENCOUNTER — PATIENT MESSAGE (OUTPATIENT)
Dept: FAMILY MEDICINE | Facility: CLINIC | Age: 45
End: 2020-12-14

## 2020-12-14 ENCOUNTER — TELEPHONE (OUTPATIENT)
Dept: FAMILY MEDICINE | Facility: CLINIC | Age: 45
End: 2020-12-14

## 2020-12-14 DIAGNOSIS — R51.9 HEAD ACHE: ICD-10-CM

## 2020-12-14 PROCEDURE — U0003 INFECTIOUS AGENT DETECTION BY NUCLEIC ACID (DNA OR RNA); SEVERE ACUTE RESPIRATORY SYNDROME CORONAVIRUS 2 (SARS-COV-2) (CORONAVIRUS DISEASE [COVID-19]), AMPLIFIED PROBE TECHNIQUE, MAKING USE OF HIGH THROUGHPUT TECHNOLOGIES AS DESCRIBED BY CMS-2020-01-R: HCPCS

## 2020-12-14 RX ORDER — TRAMADOL HYDROCHLORIDE 50 MG/1
50 TABLET ORAL EVERY 6 HOURS
Qty: 20 TABLET | Refills: 0 | Status: SHIPPED | OUTPATIENT
Start: 2020-12-14 | End: 2021-07-03

## 2020-12-14 NOTE — TELEPHONE ENCOUNTER
Rx for tramadol sent to pharmacy. Follow up with me if symptoms persist.    I have signed for the following orders AND/OR meds.  Please call the patient and ask the patient to schedule the testing AND/OR inform about any medications that were sent. Medications have been sent to pharmacy listed below      Orders Placed This Encounter   Procedures    COVID-19 Routine Screening     Standing Status:   Future     Number of Occurrences:   1     Standing Expiration Date:   2/12/2022     Order Specific Question:   Is the patient symptomatic?     Answer:   Yes     Order Specific Question:   Is this needed for pre-procedure or pre-op testing?     Answer:   No     Order Specific Question:   Does the patient have the ability to go to a drive thru location?     Answer:   Yes     Order Specific Question:   Diagnosis:     Answer:   Head ache [528790]       Medications Ordered This Encounter   Medications    traMADoL (ULTRAM) 50 mg tablet     Sig: Take 1 tablet (50 mg total) by mouth every 6 (six) hours.     Dispense:  20 tablet     Refill:  0     Quantity prescribed more than 7 day supply? No         [facility] [place] [facility]  [address]  [address] [address]  Phone: [phone] Fax: [phone]

## 2020-12-14 NOTE — TELEPHONE ENCOUNTER
Please find out what medications patient has already tried for her headache.    I have signed for the following orders AND/OR meds.  Please call the patient and ask the patient to schedule the testing AND/OR inform about any medications that were sent. Medications have been sent to pharmacy listed below      Orders Placed This Encounter   Procedures    COVID-19 Routine Screening     Standing Status:   Future     Standing Expiration Date:   2/12/2022     Order Specific Question:   Is the patient symptomatic?     Answer:   Yes     Order Specific Question:   Is this needed for pre-procedure or pre-op testing?     Answer:   No     Order Specific Question:   Does the patient have the ability to go to a drive thru location?     Answer:   Yes     Order Specific Question:   Diagnosis:     Answer:   Head ache [110245]              [facility] [place] [address]  [address] [address]  Phone: [phone] Fax: [phone]

## 2020-12-14 NOTE — TELEPHONE ENCOUNTER
Testing scheduled.    Patient states she has tried ibuprofen and rizatriptan for headache. She says neither of those have seemed to help.  Please advise.

## 2020-12-14 NOTE — TELEPHONE ENCOUNTER
Pt requesting to have Covid test done. Pt has been having a headache for the past several days and was exposed last week. Pt also requesting to have rx sent to pharmacy for headache.

## 2020-12-15 LAB — SARS-COV-2 RNA RESP QL NAA+PROBE: NOT DETECTED

## 2020-12-16 NOTE — PROGRESS NOTES
Results have been released via [employer]. Please verify that these have been viewed by patient. If not, please call patient with results.      I have sent a message to them with the following interpretation (see below).    Your test was NEGATIVE for COVID-19 (coronavirus).  If you still have symptoms, treat with rest, fluids, and over-the-counter medications.  Continue to practice proper handwashing.  Recommend stay home from from work until:  o 24 hours fever-free without the use of fever-reducing medications AND  o Improvement in respiratory symptoms (e.g. cough, shortness of breath)   If your symptoms worsen or if you have any other concerns, please contact [facility] at [phone].   Sincerely,  Vilma Lemus MD

## 2021-01-13 DIAGNOSIS — Z12.31 OTHER SCREENING MAMMOGRAM: ICD-10-CM

## 2021-03-19 ENCOUNTER — TELEPHONE (OUTPATIENT)
Dept: ADMINISTRATIVE | Facility: HOSPITAL | Age: 46
End: 2021-03-19

## 2021-03-29 ENCOUNTER — PATIENT MESSAGE (OUTPATIENT)
Dept: FAMILY MEDICINE | Facility: CLINIC | Age: 46
End: 2021-03-29

## 2021-03-29 DIAGNOSIS — R51.9 INTRACTABLE EPISODIC HEADACHE, UNSPECIFIED HEADACHE TYPE: ICD-10-CM

## 2021-03-30 RX ORDER — RIZATRIPTAN BENZOATE 10 MG/1
TABLET, ORALLY DISINTEGRATING ORAL
Qty: 9 TABLET | Refills: 3 | Status: SHIPPED | OUTPATIENT
Start: 2021-03-30 | End: 2021-08-25 | Stop reason: SDUPTHER

## 2021-05-06 ENCOUNTER — PATIENT MESSAGE (OUTPATIENT)
Dept: RESEARCH | Facility: HOSPITAL | Age: 46
End: 2021-05-06

## 2021-06-01 ENCOUNTER — PATIENT MESSAGE (OUTPATIENT)
Dept: FAMILY MEDICINE | Facility: CLINIC | Age: 46
End: 2021-06-01

## 2021-06-07 ENCOUNTER — TELEPHONE (OUTPATIENT)
Dept: ADMINISTRATIVE | Facility: HOSPITAL | Age: 46
End: 2021-06-07

## 2021-06-08 ENCOUNTER — HOSPITAL ENCOUNTER (OUTPATIENT)
Dept: RADIOLOGY | Facility: HOSPITAL | Age: 46
Discharge: HOME | End: 2021-06-08
Attending: INTERNAL MEDICINE
Payer: COMMERCIAL

## 2021-06-08 VITALS — WEIGHT: 167.13 LBS | HEIGHT: 65 IN | BODY MASS INDEX: 27.85 KG/M2

## 2021-06-08 DIAGNOSIS — Z12.31 OTHER SCREENING MAMMOGRAM: ICD-10-CM

## 2021-06-08 PROCEDURE — 77067 SCR MAMMO BI INCL CAD: CPT | Mod: 26,,, | Performed by: RADIOLOGY

## 2021-06-08 PROCEDURE — 77067 MAMMO DIGITAL SCREENING BILAT WITH TOMO: ICD-10-PCS | Mod: 26,,, | Performed by: RADIOLOGY

## 2021-06-08 PROCEDURE — 77067 SCR MAMMO BI INCL CAD: CPT | Mod: TC,PO

## 2021-06-08 PROCEDURE — 77063 MAMMO DIGITAL SCREENING BILAT WITH TOMO: ICD-10-PCS | Mod: 26,,, | Performed by: RADIOLOGY

## 2021-06-08 PROCEDURE — 77063 BREAST TOMOSYNTHESIS BI: CPT | Mod: 26,,, | Performed by: RADIOLOGY

## 2021-07-03 ENCOUNTER — OFFICE VISIT (OUTPATIENT)
Dept: FAMILY MEDICINE | Facility: CLINIC | Age: 46
End: 2021-07-03
Payer: COMMERCIAL

## 2021-07-03 VITALS
DIASTOLIC BLOOD PRESSURE: 78 MMHG | TEMPERATURE: 98 F | WEIGHT: 146 LBS | SYSTOLIC BLOOD PRESSURE: 107 MMHG | HEART RATE: 101 BPM | BODY MASS INDEX: 24.32 KG/M2 | HEIGHT: 65 IN

## 2021-07-03 DIAGNOSIS — G43.009 MIGRAINE WITHOUT AURA AND WITHOUT STATUS MIGRAINOSUS, NOT INTRACTABLE: Primary | ICD-10-CM

## 2021-07-03 DIAGNOSIS — M54.2 CERVICALGIA: ICD-10-CM

## 2021-07-03 PROCEDURE — 96372 PR INJECTION,THERAP/PROPH/DIAG2ST, IM OR SUBCUT: ICD-10-PCS | Mod: S$GLB,,, | Performed by: FAMILY MEDICINE

## 2021-07-03 PROCEDURE — 99214 OFFICE O/P EST MOD 30 MIN: CPT | Mod: 25,S$GLB,, | Performed by: FAMILY MEDICINE

## 2021-07-03 PROCEDURE — 99214 PR OFFICE/OUTPT VISIT, EST, LEVL IV, 30-39 MIN: ICD-10-PCS | Mod: 25,S$GLB,, | Performed by: FAMILY MEDICINE

## 2021-07-03 PROCEDURE — 99999 PR PBB SHADOW E&M-EST. PATIENT-LVL III: ICD-10-PCS | Mod: PBBFAC,,, | Performed by: FAMILY MEDICINE

## 2021-07-03 PROCEDURE — 96372 THER/PROPH/DIAG INJ SC/IM: CPT | Mod: S$GLB,,, | Performed by: FAMILY MEDICINE

## 2021-07-03 PROCEDURE — 99999 PR PBB SHADOW E&M-EST. PATIENT-LVL III: CPT | Mod: PBBFAC,,, | Performed by: FAMILY MEDICINE

## 2021-07-03 RX ORDER — KETOROLAC TROMETHAMINE 30 MG/ML
60 INJECTION, SOLUTION INTRAMUSCULAR; INTRAVENOUS ONCE
Status: COMPLETED | OUTPATIENT
Start: 2021-07-03 | End: 2021-07-03

## 2021-07-03 RX ORDER — BUTALBITAL, ACETAMINOPHEN AND CAFFEINE 50; 325; 40 MG/1; MG/1; MG/1
1-2 TABLET ORAL EVERY 6 HOURS PRN
Qty: 16 TABLET | Refills: 0 | Status: SHIPPED | OUTPATIENT
Start: 2021-07-03 | End: 2022-11-04

## 2021-07-03 RX ADMIN — KETOROLAC TROMETHAMINE 60 MG: 30 INJECTION, SOLUTION INTRAMUSCULAR; INTRAVENOUS at 10:07

## 2021-07-08 ENCOUNTER — PATIENT MESSAGE (OUTPATIENT)
Dept: FAMILY MEDICINE | Facility: CLINIC | Age: 46
End: 2021-07-08

## 2021-07-16 ENCOUNTER — TELEPHONE (OUTPATIENT)
Dept: FAMILY MEDICINE | Facility: CLINIC | Age: 46
End: 2021-07-16

## 2021-07-21 ENCOUNTER — PATIENT MESSAGE (OUTPATIENT)
Dept: FAMILY MEDICINE | Facility: CLINIC | Age: 46
End: 2021-07-21

## 2021-08-25 ENCOUNTER — PATIENT MESSAGE (OUTPATIENT)
Dept: FAMILY MEDICINE | Facility: CLINIC | Age: 46
End: 2021-08-25

## 2021-08-25 DIAGNOSIS — R51.9 INTRACTABLE EPISODIC HEADACHE, UNSPECIFIED HEADACHE TYPE: ICD-10-CM

## 2021-08-25 RX ORDER — RIZATRIPTAN BENZOATE 10 MG/1
TABLET, ORALLY DISINTEGRATING ORAL
Qty: 9 TABLET | Refills: 3 | Status: SHIPPED | OUTPATIENT
Start: 2021-08-25 | End: 2022-02-25

## 2021-09-08 ENCOUNTER — TELEPHONE (OUTPATIENT)
Dept: FAMILY MEDICINE | Facility: CLINIC | Age: 46
End: 2021-09-08

## 2021-09-08 ENCOUNTER — OFFICE VISIT (OUTPATIENT)
Dept: FAMILY MEDICINE | Facility: CLINIC | Age: 46
End: 2021-09-08
Payer: COMMERCIAL

## 2021-09-08 ENCOUNTER — PATIENT MESSAGE (OUTPATIENT)
Dept: FAMILY MEDICINE | Facility: CLINIC | Age: 46
End: 2021-09-08

## 2021-09-08 DIAGNOSIS — Z13.6 ENCOUNTER FOR LIPID SCREENING FOR CARDIOVASCULAR DISEASE: ICD-10-CM

## 2021-09-08 DIAGNOSIS — Z13.220 ENCOUNTER FOR LIPID SCREENING FOR CARDIOVASCULAR DISEASE: ICD-10-CM

## 2021-09-08 DIAGNOSIS — L23.7 POISON IVY DERMATITIS: ICD-10-CM

## 2021-09-08 DIAGNOSIS — L23.7 POISON IVY DERMATITIS: Primary | ICD-10-CM

## 2021-09-08 DIAGNOSIS — E05.90 SUBCLINICAL HYPERTHYROIDISM: ICD-10-CM

## 2021-09-08 DIAGNOSIS — Z00.00 ENCOUNTER FOR ANNUAL HEALTH EXAMINATION: ICD-10-CM

## 2021-09-08 PROCEDURE — 99213 PR OFFICE/OUTPT VISIT, EST, LEVL III, 20-29 MIN: ICD-10-PCS | Mod: 95,,, | Performed by: INTERNAL MEDICINE

## 2021-09-08 PROCEDURE — 99213 OFFICE O/P EST LOW 20 MIN: CPT | Mod: 95,,, | Performed by: INTERNAL MEDICINE

## 2021-09-08 RX ORDER — METHYLPREDNISOLONE 4 MG/1
TABLET ORAL
Qty: 21 TABLET | Refills: 0 | Status: SHIPPED | OUTPATIENT
Start: 2021-09-08 | End: 2021-09-08 | Stop reason: SDUPTHER

## 2021-09-08 RX ORDER — METHYLPREDNISOLONE 4 MG/1
TABLET ORAL
Qty: 21 TABLET | Refills: 0 | Status: SHIPPED | OUTPATIENT
Start: 2021-09-08 | End: 2021-09-14 | Stop reason: ALTCHOICE

## 2021-09-09 ENCOUNTER — LAB VISIT (OUTPATIENT)
Dept: LAB | Facility: HOSPITAL | Age: 46
End: 2021-09-09
Attending: INTERNAL MEDICINE
Payer: COMMERCIAL

## 2021-09-09 DIAGNOSIS — Z13.220 ENCOUNTER FOR LIPID SCREENING FOR CARDIOVASCULAR DISEASE: ICD-10-CM

## 2021-09-09 DIAGNOSIS — E05.90 SUBCLINICAL HYPERTHYROIDISM: ICD-10-CM

## 2021-09-09 DIAGNOSIS — Z00.00 ENCOUNTER FOR ANNUAL HEALTH EXAMINATION: ICD-10-CM

## 2021-09-09 DIAGNOSIS — Z13.6 ENCOUNTER FOR LIPID SCREENING FOR CARDIOVASCULAR DISEASE: ICD-10-CM

## 2021-09-09 LAB
CHOLEST SERPL-MCNC: 187 MG/DL (ref 120–199)
CHOLEST/HDLC SERPL: 2.6 {RATIO} (ref 2–5)
ERYTHROCYTE [DISTWIDTH] IN BLOOD BY AUTOMATED COUNT: 13.1 % (ref 11.5–14.5)
HCT VFR BLD AUTO: 39.7 % (ref 37–48.5)
HDLC SERPL-MCNC: 71 MG/DL (ref 40–75)
HDLC SERPL: 38 % (ref 20–50)
HGB BLD-MCNC: 12.7 G/DL (ref 12–16)
LDLC SERPL CALC-MCNC: 105.6 MG/DL (ref 63–159)
MCH RBC QN AUTO: 30.6 PG (ref 27–31)
MCHC RBC AUTO-ENTMCNC: 32 G/DL (ref 32–36)
MCV RBC AUTO: 96 FL (ref 82–98)
NONHDLC SERPL-MCNC: 116 MG/DL
PLATELET # BLD AUTO: 299 K/UL (ref 150–450)
PMV BLD AUTO: 10.6 FL (ref 9.2–12.9)
RBC # BLD AUTO: 4.15 M/UL (ref 4–5.4)
T3 SERPL-MCNC: 75 NG/DL (ref 60–180)
T4 FREE SERPL-MCNC: 0.92 NG/DL (ref 0.71–1.51)
TRIGL SERPL-MCNC: 52 MG/DL (ref 30–150)
TSH SERPL DL<=0.005 MIU/L-ACNC: 0.45 UIU/ML (ref 0.4–4)
WBC # BLD AUTO: 7.17 K/UL (ref 3.9–12.7)

## 2021-09-09 PROCEDURE — 84480 ASSAY TRIIODOTHYRONINE (T3): CPT | Performed by: INTERNAL MEDICINE

## 2021-09-09 PROCEDURE — 84443 ASSAY THYROID STIM HORMONE: CPT | Performed by: INTERNAL MEDICINE

## 2021-09-09 PROCEDURE — 80061 LIPID PANEL: CPT | Performed by: INTERNAL MEDICINE

## 2021-09-09 PROCEDURE — 36415 COLL VENOUS BLD VENIPUNCTURE: CPT | Mod: PO | Performed by: INTERNAL MEDICINE

## 2021-09-09 PROCEDURE — 85027 COMPLETE CBC AUTOMATED: CPT | Performed by: INTERNAL MEDICINE

## 2021-09-09 PROCEDURE — 84439 ASSAY OF FREE THYROXINE: CPT | Performed by: INTERNAL MEDICINE

## 2021-09-14 ENCOUNTER — PATIENT MESSAGE (OUTPATIENT)
Dept: FAMILY MEDICINE | Facility: CLINIC | Age: 46
End: 2021-09-14

## 2021-09-14 DIAGNOSIS — L23.7 POISON IVY DERMATITIS: ICD-10-CM

## 2021-09-14 RX ORDER — METHYLPREDNISOLONE 4 MG/1
TABLET ORAL
Qty: 21 TABLET | Refills: 0 | Status: CANCELLED | OUTPATIENT
Start: 2021-09-14

## 2021-09-14 RX ORDER — PREDNISONE 10 MG/1
TABLET ORAL
Qty: 29 TABLET | Refills: 0 | Status: SHIPPED | OUTPATIENT
Start: 2021-09-14 | End: 2022-11-04

## 2021-10-04 ENCOUNTER — PATIENT MESSAGE (OUTPATIENT)
Dept: FAMILY MEDICINE | Facility: CLINIC | Age: 46
End: 2021-10-04

## 2021-10-04 DIAGNOSIS — Z01.84 ENCOUNTER FOR ANTIBODY RESPONSE EXAMINATION: Primary | ICD-10-CM

## 2022-02-23 DIAGNOSIS — R51.9 INTRACTABLE EPISODIC HEADACHE, UNSPECIFIED HEADACHE TYPE: ICD-10-CM

## 2022-02-23 NOTE — TELEPHONE ENCOUNTER
No new care gaps identified.  Powered by [employer]. Reference number: [id number].   2/23/2022 3:09:28 PM CST

## 2022-02-25 RX ORDER — RIZATRIPTAN BENZOATE 10 MG/1
TABLET, ORALLY DISINTEGRATING ORAL
Qty: 27 TABLET | Refills: 1 | Status: SHIPPED | OUTPATIENT
Start: 2022-02-25 | End: 2022-11-17

## 2022-02-26 NOTE — TELEPHONE ENCOUNTER
Refill Authorization Note   Vijaya Redding  is requesting a refill authorization.  Brief Assessment and Rationale for Refill:  Approve     Medication Therapy Plan:       Medication Reconciliation Completed: No   Comments:   --->Care Gap information included below if applicable.   Orders Placed This Encounter    rizatriptan (MAXALT-MLT) 10 MG disintegrating tablet      Requested Prescriptions   Signed Prescriptions Disp Refills    rizatriptan (MAXALT-MLT) 10 MG disintegrating tablet 27 tablet 1     Sig: TAKE 1 TABLET AS NEEDED FOR HEADACHE -may REPEAT IN 2 hours AS NEEDED       Neurology:  Migraine Therapy - Triptan Passed - 2/23/2022  3:09 PM        Passed - Patient is at least 18 years old        Passed - Negative Pregnancy Status Check        Passed - No contraindicated diagnoses on problem list     Chest Pain  Angina  Myocardial Infarction (MI)  Ischemic Heart Disease  Coronary Artery Disease              Passed - SBP is equal to or less than 150, DBP is equal to or less than 100     BP Readings from Last 1 Encounters:   07/03/21 107/78               Passed - Valid encounter within last 15 months     Recent Visits  Date Type Provider Dept   09/08/21 Office Visit Vilma Lemus MD [facility]   06/08/20 Office Visit Vilma Lemus MD [facility]   Showing recent visits within past 720 days and meeting all other requirements  Future Appointments  No visits were found meeting these conditions.  Showing future appointments within next 150 days and meeting all other requirements                    Appointments  past 12m or future 3m with PCP    Date Provider   Last Visit   9/8/2021 Vilma Lemus MD   Next Visit   Visit date not found Vilma Lemus MD   ED visits in past 90 days: 0     Note composed:7:49 PM 02/25/2022

## 2022-03-18 ENCOUNTER — PATIENT MESSAGE (OUTPATIENT)
Dept: ADMINISTRATIVE | Facility: HOSPITAL | Age: 47
End: 2022-03-18
Payer: COMMERCIAL

## 2022-04-20 LAB
HPV16 DNA CVX QL PROBE+SIG AMP: NOT DETECTED
HPV16+18+H RISK 12 DNA CVX-IMP: NOT DETECTED
HPV18 DNA CVX QL PROBE+SIG AMP: NOT DETECTED
PAP RECOMMENDATION EXT: NORMAL

## 2022-05-02 ENCOUNTER — PATIENT MESSAGE (OUTPATIENT)
Dept: ADMINISTRATIVE | Facility: HOSPITAL | Age: 47
End: 2022-05-02
Payer: COMMERCIAL

## 2022-05-02 DIAGNOSIS — Z12.11 SCREENING FOR COLON CANCER: ICD-10-CM

## 2022-05-02 LAB
BUN SERPL-MCNC: 7 MG/DL (ref 6–20)
BUN/CREAT SERPL: 10 (ref 10–28)
CALCIUM SERPL-MCNC: 9.2 MG/DL (ref 8.6–10.4)
CHLORIDE SERPL-SCNC: 103 MMOL/L (ref 96–108)
CHOL/HDLC RATIO: 2.8
CHOLEST SERPL-MSCNC: 192 MG/DL
CO2 SERPL-SCNC: 23 MMOL/L (ref 19–29)
CREAT SERPL-MCNC: 0.7 MG/DL (ref 0.5–1)
EGFR IF AFRICAN AMERICAN: 116 ML/MIN/1.73M²
GLUCOSE SERPL-MCNC: 85 MG/DL (ref 70–99)
HDL CHOLESTEROL: 69 MG/DL
LDL/HDL RATIO: 1.6
LDLC SERPL CALC-MCNC: 109 MG/DL
NON HDL CHOL. (LDL+VLDL): 123 MG/DL
POTASSIUM SERPL-SCNC: 4 MMOL/L (ref 3.5–5.5)
SODIUM BLD-SCNC: 138 MMOL/L (ref 135–147)
TRIGL SERPL-MCNC: 68 MG/DL
VLDLC SERPL-MCNC: 14 MG/DL (ref 7–32)

## 2022-05-27 ENCOUNTER — PATIENT MESSAGE (OUTPATIENT)
Dept: FAMILY MEDICINE | Facility: CLINIC | Age: 47
End: 2022-05-27
Payer: COMMERCIAL

## 2022-05-27 LAB — HEMOCCULT STL QL IA: NEGATIVE

## 2022-05-31 NOTE — PROGRESS NOTES
FIT testing is negative, repeat in 1 year, results released through [employer]. Please verify that patient has viewed results. If not, please call patient with interpretation below:    I have reviewed the results of your FIT testing for colon cancer screening. This test is negative. We will plan to repeat in 1 year.     Also please see below health maintenance items that are due:    Hepatitis C Screening Never done  COVID-19 Vaccine(1) Never done  HIV Screening Never done  TETANUS VACCINE Never done  Cervical Cancer Screening due on 03/01/2022  Mammogram due on 06/08/2022

## 2022-06-14 ENCOUNTER — PATIENT MESSAGE (OUTPATIENT)
Dept: FAMILY MEDICINE | Facility: CLINIC | Age: 47
End: 2022-06-14
Payer: COMMERCIAL

## 2022-06-14 ENCOUNTER — TELEPHONE (OUTPATIENT)
Dept: ADMINISTRATIVE | Facility: HOSPITAL | Age: 47
End: 2022-06-14
Payer: COMMERCIAL

## 2022-06-14 RX ORDER — SPINOSAD 9 MG/ML
SUSPENSION TOPICAL
Qty: 120 ML | Refills: 0 | Status: SHIPPED | OUTPATIENT
Start: 2022-06-14 | End: 2022-08-17 | Stop reason: SDUPTHER

## 2022-06-14 NOTE — TELEPHONE ENCOUNTER
I have signed for the following orders AND/OR meds.  Please call the patient and ask the patient to schedule the testing AND/OR inform about any medications that were sent. Medications have been sent to pharmacy listed below      No orders of the defined types were placed in this encounter.      Medications Ordered This Encounter   Medications    spinosad 0.9 % Susp     Sig: Apply sufficient amount to cover dry scalp and completely cover dry hair     Dispense:  120 mL     Refill:  0         [facility] [place] - [facility] [address]  [address] [address]  Phone: [phone] Fax: [phone]    [facility] - [place] - [address]  [address] [address]  [facility] [address]  Phone: [phone] Fax: [phone]    [id number] - [place], [place] - [address]  [address] [address]  Phone: [phone] Fax: [phone]

## 2022-06-15 ENCOUNTER — PATIENT MESSAGE (OUTPATIENT)
Dept: FAMILY MEDICINE | Facility: CLINIC | Age: 47
End: 2022-06-15
Payer: COMMERCIAL

## 2022-06-23 ENCOUNTER — PATIENT OUTREACH (OUTPATIENT)
Dept: ADMINISTRATIVE | Facility: HOSPITAL | Age: 47
End: 2022-06-23
Payer: COMMERCIAL

## 2022-06-23 NOTE — PROGRESS NOTES
Working Cervical Cancer Screening Report:     Manually uploaded and hyper linked Pap Smear with HPV, Lipid, BMP    Health Maintenance Updated  Immunizations Updated  Care Everywhere Updated Found Pap Smear with HPV, Lipid, BMP  [facility] Searched-None found  [facility] Searched-None found  [facility] Searched-None found

## 2022-07-12 ENCOUNTER — PATIENT MESSAGE (OUTPATIENT)
Dept: FAMILY MEDICINE | Facility: CLINIC | Age: 47
End: 2022-07-12
Payer: COMMERCIAL

## 2022-07-26 ENCOUNTER — TELEPHONE (OUTPATIENT)
Dept: ADMINISTRATIVE | Facility: HOSPITAL | Age: 47
End: 2022-07-26
Payer: COMMERCIAL

## 2022-07-27 ENCOUNTER — HOSPITAL ENCOUNTER (OUTPATIENT)
Dept: RADIOLOGY | Facility: HOSPITAL | Age: 47
Discharge: HOME | End: 2022-07-27
Attending: SPECIALIST
Payer: COMMERCIAL

## 2022-07-27 VITALS — HEIGHT: 65 IN | BODY MASS INDEX: 24.32 KG/M2 | WEIGHT: 145.94 LBS

## 2022-07-27 DIAGNOSIS — Z12.31 ENCOUNTER FOR SCREENING MAMMOGRAM FOR MALIGNANT NEOPLASM OF BREAST: ICD-10-CM

## 2022-07-27 PROCEDURE — 77063 BREAST TOMOSYNTHESIS BI: CPT | Mod: 26,,, | Performed by: RADIOLOGY

## 2022-07-27 PROCEDURE — 77067 MAMMO DIGITAL SCREENING BILAT WITH TOMO: ICD-10-PCS | Mod: 26,,, | Performed by: RADIOLOGY

## 2022-07-27 PROCEDURE — 77063 MAMMO DIGITAL SCREENING BILAT WITH TOMO: ICD-10-PCS | Mod: 26,,, | Performed by: RADIOLOGY

## 2022-07-27 PROCEDURE — 77067 SCR MAMMO BI INCL CAD: CPT | Mod: 26,,, | Performed by: RADIOLOGY

## 2022-07-27 PROCEDURE — 77063 BREAST TOMOSYNTHESIS BI: CPT | Mod: TC,PO

## 2022-07-27 PROCEDURE — 77067 SCR MAMMO BI INCL CAD: CPT | Mod: TC,PO

## 2022-08-17 ENCOUNTER — PATIENT MESSAGE (OUTPATIENT)
Dept: FAMILY MEDICINE | Facility: CLINIC | Age: 47
End: 2022-08-17
Payer: COMMERCIAL

## 2022-11-03 ENCOUNTER — PATIENT MESSAGE (OUTPATIENT)
Dept: FAMILY MEDICINE | Facility: CLINIC | Age: 47
End: 2022-11-03
Payer: COMMERCIAL

## 2022-11-04 ENCOUNTER — OFFICE VISIT (OUTPATIENT)
Dept: FAMILY MEDICINE | Facility: CLINIC | Age: 47
End: 2022-11-04
Payer: COMMERCIAL

## 2022-11-04 VITALS
SYSTOLIC BLOOD PRESSURE: 105 MMHG | TEMPERATURE: 100 F | DIASTOLIC BLOOD PRESSURE: 65 MMHG | WEIGHT: 168 LBS | BODY MASS INDEX: 27.99 KG/M2 | HEART RATE: 122 BPM | HEIGHT: 65 IN

## 2022-11-04 DIAGNOSIS — G43.009 MIGRAINE WITHOUT AURA AND WITHOUT STATUS MIGRAINOSUS, NOT INTRACTABLE: ICD-10-CM

## 2022-11-04 DIAGNOSIS — J10.1 INFLUENZA A: Primary | ICD-10-CM

## 2022-11-04 LAB
CTP QC/QA: YES
CTP QC/QA: YES
POC MOLECULAR INFLUENZA A AGN: POSITIVE
POC MOLECULAR INFLUENZA B AGN: NEGATIVE
SARS-COV-2 RDRP RESP QL NAA+PROBE: NEGATIVE

## 2022-11-04 PROCEDURE — 99999 PR PBB SHADOW E&M-EST. PATIENT-LVL III: ICD-10-PCS | Mod: PBBFAC,,, | Performed by: FAMILY MEDICINE

## 2022-11-04 PROCEDURE — 99999 PR PBB SHADOW E&M-EST. PATIENT-LVL III: CPT | Mod: PBBFAC,,, | Performed by: FAMILY MEDICINE

## 2022-11-04 PROCEDURE — 3008F BODY MASS INDEX DOCD: CPT | Mod: CPTII,S$GLB,, | Performed by: FAMILY MEDICINE

## 2022-11-04 PROCEDURE — 1159F MED LIST DOCD IN RCRD: CPT | Mod: CPTII,S$GLB,, | Performed by: FAMILY MEDICINE

## 2022-11-04 PROCEDURE — 87635: ICD-10-PCS | Mod: QW,S$GLB,, | Performed by: FAMILY MEDICINE

## 2022-11-04 PROCEDURE — 87502 INFLUENZA DNA AMP PROBE: CPT | Mod: QW,S$GLB,, | Performed by: FAMILY MEDICINE

## 2022-11-04 PROCEDURE — 87635 SARS-COV-2 COVID-19 AMP PRB: CPT | Mod: QW,S$GLB,, | Performed by: FAMILY MEDICINE

## 2022-11-04 PROCEDURE — 99213 PR OFFICE/OUTPT VISIT, EST, LEVL III, 20-29 MIN: ICD-10-PCS | Mod: S$GLB,,, | Performed by: FAMILY MEDICINE

## 2022-11-04 PROCEDURE — 99213 OFFICE O/P EST LOW 20 MIN: CPT | Mod: S$GLB,,, | Performed by: FAMILY MEDICINE

## 2022-11-04 PROCEDURE — 3008F PR BODY MASS INDEX (BMI) DOCUMENTED: ICD-10-PCS | Mod: CPTII,S$GLB,, | Performed by: FAMILY MEDICINE

## 2022-11-04 PROCEDURE — 3074F PR MOST RECENT SYSTOLIC BLOOD PRESSURE < 130 MM HG: ICD-10-PCS | Mod: CPTII,S$GLB,, | Performed by: FAMILY MEDICINE

## 2022-11-04 PROCEDURE — 3078F DIAST BP <80 MM HG: CPT | Mod: CPTII,S$GLB,, | Performed by: FAMILY MEDICINE

## 2022-11-04 PROCEDURE — 1159F PR MEDICATION LIST DOCUMENTED IN MEDICAL RECORD: ICD-10-PCS | Mod: CPTII,S$GLB,, | Performed by: FAMILY MEDICINE

## 2022-11-04 PROCEDURE — 87502 POCT INFLUENZA A/B MOLECULAR: ICD-10-PCS | Mod: QW,S$GLB,, | Performed by: FAMILY MEDICINE

## 2022-11-04 PROCEDURE — 3078F PR MOST RECENT DIASTOLIC BLOOD PRESSURE < 80 MM HG: ICD-10-PCS | Mod: CPTII,S$GLB,, | Performed by: FAMILY MEDICINE

## 2022-11-04 PROCEDURE — 3074F SYST BP LT 130 MM HG: CPT | Mod: CPTII,S$GLB,, | Performed by: FAMILY MEDICINE

## 2022-11-04 RX ORDER — BUTALBITAL, ACETAMINOPHEN AND CAFFEINE 50; 325; 40 MG/1; MG/1; MG/1
1-2 TABLET ORAL EVERY 6 HOURS PRN
Qty: 16 TABLET | Refills: 0 | Status: SHIPPED | OUTPATIENT
Start: 2022-11-04 | End: 2023-12-27 | Stop reason: CLARIF

## 2022-11-04 RX ORDER — PREDNISONE 20 MG/1
20 TABLET ORAL DAILY
COMMUNITY
Start: 2022-11-02 | End: 2023-12-27 | Stop reason: CLARIF

## 2022-11-04 RX ORDER — OSELTAMIVIR PHOSPHATE 75 MG/1
75 CAPSULE ORAL 2 TIMES DAILY
Qty: 10 CAPSULE | Refills: 0 | Status: SHIPPED | OUTPATIENT
Start: 2022-11-04 | End: 2022-11-09

## 2022-11-04 NOTE — PROGRESS NOTES
Patient complains of sore throat, otalgia, congestion, postnasal drainage, cough, 101 fever.  Symptoms started over the past 4 days.  Current treatment steroids that she received from urgent care.  She had negative COVID, flu, strep testing day after symptoms began.  No wheezing.  She does have migraine headaches and has had headache with current symptoms not relieved with Maxalt.    Vijaya was seen today for cough.    Diagnoses and all orders for this visit:    Influenza A  -     POCT Influenza A/B Molecular; Future  -     POCT COVID-19 Rapid Screening; Future  -     POCT COVID-19 Rapid Screening  -     POCT Influenza A/B Molecular    Migraine without aura and without status migrainosus, not intractable    Other orders  -     oseltamivir (TAMIFLU) 75 MG capsule; Take 1 capsule (75 mg total) by mouth 2 (two) times daily. for 5 days  -     butalbital-acetaminophen-caffeine -40 mg (FIORICET, ESGIC) -40 mg per tablet; Take 1-2 tablets by mouth every 6 (six) hours as needed for Headaches.    Negative.  Influenza a is positive.  May use Tylenol  Follow-up as needed if symptoms not improving with Recommended treatment next few days      Past Medical History:  Past Medical History:   Diagnosis Date    Abnormal Pap smear     repeat pap    Herpes genitalia     Migraines      Past Surgical History:   Procedure Laterality Date    AUGMENTATION OF BREAST      breast implants  2001    dx lap      endometriosis    TONSILLECTOMY       Social History     Socioeconomic History    Marital status:     Number of children: 2   Occupational History     Employer: [employer]   Tobacco Use    Smoking status: Never    Smokeless tobacco: Never   Substance and Sexual Activity    Alcohol use: Yes     Comment: socially    Drug use: No    Sexual activity: Yes     Partners: Male     Family History   Problem Relation Age of Onset    Osteoporosis Mother     Arthritis Mother     Goiter Mother     Ovarian cancer Mother      "Cancer Paternal Aunt         unknown GYN in origin    Ovarian cancer Paternal Aunt     Breast cancer Paternal Aunt     Arthritis Maternal Grandmother     Hypothyroidism Sister     Thyroid cancer Neg Hx      Review of patient's allergies indicates:   Allergen Reactions    Sulfa (sulfonamide antibiotics)      Current Outpatient Medications on File Prior to Visit   Medication Sig Dispense Refill    predniSONE (DELTASONE) 20 MG tablet Take 20 mg by mouth once daily.      rizatriptan (MAXALT-MLT) 10 MG disintegrating tablet TAKE 1 TABLET AS NEEDED FOR HEADACHE -may REPEAT IN 2 hours AS NEEDED 27 tablet 1    [DISCONTINUED] butalbital-acetaminophen-caffeine -40 mg (FIORICET, ESGIC) -40 mg per tablet Take 1-2 tablets by mouth every 6 (six) hours as needed for Headaches. 16 tablet 0    [DISCONTINUED] dextroamphetamine-amphetamine (ADDERALL XR) 15 MG 24 hr capsule Take 15 mg by mouth every morning.      [DISCONTINUED] spinosad 0.9 % Susp Apply sufficient amount to cover dry scalp and completely cover dry hair 120 mL 0    [DISCONTINUED] predniSONE (DELTASONE) 10 MG tablet Take 4 daily for 5 days, then 2 daily for 3 days, then 1 daily for 3 days , then stop 29 tablet 0     No current facility-administered medications on file prior to visit.         Physical Exam:  Vitals:    11/04/22 1005   BP: 105/65   Pulse: (!) 122   Temp: 99.6 °F (37.6 °C)   TempSrc: Oral   Weight: 76.2 kg (167 lb 15.9 oz)   Height: 5' 5" (1.651 m)       Gen.: No acute distress  HEENT: sinuses nontender. Ears: Tympanic membranes intact.  No erythema or effusion.  Nose mild congestion.  Throat mild erythema no exudate.  Mild postnasal drainage.  Neck supple no adenopathy  Chest: Clear to auscultation.  Normal respiratory effort.            "

## 2022-11-07 ENCOUNTER — PATIENT MESSAGE (OUTPATIENT)
Dept: FAMILY MEDICINE | Facility: CLINIC | Age: 47
End: 2022-11-07
Payer: COMMERCIAL

## 2022-11-13 ENCOUNTER — PATIENT MESSAGE (OUTPATIENT)
Dept: FAMILY MEDICINE | Facility: CLINIC | Age: 47
End: 2022-11-13
Payer: COMMERCIAL

## 2022-12-13 ENCOUNTER — E-VISIT (OUTPATIENT)
Dept: FAMILY MEDICINE | Facility: CLINIC | Age: 47
End: 2022-12-13
Payer: COMMERCIAL

## 2022-12-13 DIAGNOSIS — J06.9 UPPER RESPIRATORY TRACT INFECTION, UNSPECIFIED TYPE: Primary | ICD-10-CM

## 2022-12-13 PROCEDURE — 99421 OL DIG E/M SVC 5-10 MIN: CPT | Mod: S$GLB,,, | Performed by: FAMILY MEDICINE

## 2022-12-13 PROCEDURE — 99421 PR E&M, ONLINE DIGIT, EST, < 7 DAYS, 5-10 MINS: ICD-10-PCS | Mod: S$GLB,,, | Performed by: FAMILY MEDICINE

## 2022-12-13 RX ORDER — GUAIFENESIN 600 MG/1
600 TABLET, EXTENDED RELEASE ORAL 2 TIMES DAILY
Qty: 20 TABLET | Refills: 0 | COMMUNITY
Start: 2022-12-13 | End: 2023-12-13

## 2022-12-13 RX ORDER — FLUTICASONE PROPIONATE 50 MCG
SPRAY, SUSPENSION (ML) NASAL
Qty: 16 G | Refills: 1 | Status: SHIPPED | OUTPATIENT
Start: 2022-12-13 | End: 2023-12-27 | Stop reason: CLARIF

## 2022-12-13 NOTE — PROGRESS NOTES
Patient ID: Vijaya Redding is a 47 y.o. female.    Chief Complaint: Nasal Congestion, Sore Throat, and Postnasal drainage    The patient initiated a request through [facility] on 12/13/2022 for evaluation and management with a chief complaint of Nasal Congestion, Sore Throat, and Postnasal drainage     I evaluated the questionnaire submission on 12/13/22.    Ohs Peq Evisit Upper Respitatory/Cough Questionnaire    12/13/2022 12:09 PM CST - Filed by Patient   Do you agree to participate in an E-Visit? Yes   If you have any of the following symptoms, please present to your local ER or call 911:  I acknowledge   What is the main issue that you would like for your doctor to address today? Yellow mucus when blowing nose. Tickle in throat and Congestion in yhroat   Are you able to take your vital signs? Yes   Systolic Blood Pressure: 110   Diastolic Blood Pressure: 78   Weight: 167   Height: 64   Pulse: 98   Temp: 98.2   Resp: 20   Pulse Ox: 99   What symptoms do you currently have?  Nasal Congestion;  Sore throat   Have you had a fever? No   When did your symptoms first appear? 12/8/2022   In the last two weeks, have you been in close contact with someone who has COVID-19 or the Flu? No   In the last two weeks, have you worked or volunteered in a healthcare facility or as a ? Healthcare facilities include a hospital, medical or dental clinic, long-term care facility, or nursing home No   Do you live in a long-term care facility, nursing home, or homeless shelter? No   List what you have done or taken to help your symptoms. Zyrtec now zyzol   How severe are your symptoms? Mild   Have you taken an at home Covid test? No   Have you taken a Flu test? No   Have you been fully vaccinated for COVID? (2 Pfizer, 2 Moderna or 1 Mookie & Mookie vaccine injections) No   Have you received your first dose of the Pfizer or Moderna COVID vaccine? No   Have you recieved a Flu shot? No   Do you have transportation to get  tested for COVID if it is indicated and ordered for you at an [facility] location? Yes   Are you currently pregnant, could you be pregnant, or are you breast feeding? None of the above   Provide any information you feel is important to your history not asked above I dont feel bad just yellow mucus nastiness possibly sinus infection   Please attach any relevant images or files           Active Problem List with Overview Notes    Diagnosis Date Noted    Subclinical hyperthyroidism 03/23/2017     -evaluated by Endocrinology in the past  -last TSH slightly low, normal T4  -denies symptoms  -patient would like to hold off follow-up for endocrinology follow-up  -discussed rechecking thyroid studies and will decide at that time if patient should be seen by endo      Headache causing frequent awakening from sleep 10/18/2014    Migraine 10/18/2014     -diagnosed >15 years ago  -worsening in frequency  -known cervical DDD, diagnosed via MRI in 2014  -using Maxalt PRN   -trial of topamax recently but experienced adverse effects without much improvement of headaches  -requesting referral to Dr. Bijal Silva with pain management for further treatment  -does reports worsening severity and nocturnal occurrence. No MRI of brain in chart (patient reports done at time of diagnosis). I would recommend that this be repeated but will have her follow up with pain management. If not able to get appt soon, will go ahead and order      ADD (attention deficit disorder) 10/18/2014     -followed by psychiatry, Dr. 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   Latest known visit with results is:   Office Visit on  11/04/2022   Component Date Value Ref Range Status    POC Rapid COVID 11/04/2022 Negative  Negative Final     Acceptable 11/04/2022 Yes   Final    POC Molecular Influenza A Ag 11/04/2022 Positive (A)  Negative, Not Reported Final    POC Molecular Influenza B Ag 11/04/2022 Negative  Negative, Not Reported Final     Acceptable 11/04/2022 Yes   Final       Encounter Diagnosis   Name Primary?    Upper respiratory tract infection, unspecified type Yes        No orders of the defined types were placed in this encounter.     Medications Ordered This Encounter   Medications    fluticasone propionate (FLONASE) 50 mcg/actuation nasal spray     Sig: Use 2 sprays to each nostril daily     Dispense:  16 g     Refill:  1    guaiFENesin (MUCINEX) 600 mg 12 hr tablet     Sig: Take 1 tablet (600 mg total) by mouth 2 (two) times daily.     Dispense:  20 tablet     Refill:  0        E-Visit Time Tracking:    Day 1 Time (in minutes): 4     Total Time (in minutes): 4         It sounds like you have an upper respiratory virus.  If you are not having fever and since she recently had the flu I doubt that you are getting the flu again.  In any case it would be too late to take medication for flu as far as antiviral therapy.  COVID-19 would be a possibility, but again it would be past the time where antiviral therapy would be helpful as this medication needs to be given within 5 days of onset of symptoms.  At this point I would recommend symptomatic treatment.  I sent in a prescription for Flonase nasal spray.  I would recommend that you  some Mucinex which is in expectorant to help thin out the mucus.  This is over-the-counter.  Please follow-up if symptoms are not improving within the next 5 days or if you develop fever or more severe symptoms.    Thanks,   Dr. Bell

## 2023-02-09 ENCOUNTER — PATIENT MESSAGE (OUTPATIENT)
Dept: FAMILY MEDICINE | Facility: CLINIC | Age: 48
End: 2023-02-09
Payer: COMMERCIAL

## 2023-02-09 DIAGNOSIS — G43.009 MIGRAINE WITHOUT AURA AND WITHOUT STATUS MIGRAINOSUS, NOT INTRACTABLE: Primary | ICD-10-CM

## 2023-02-10 RX ORDER — PROMETHAZINE HYDROCHLORIDE 25 MG/1
25 TABLET ORAL EVERY 6 HOURS PRN
Qty: 20 TABLET | Refills: 0 | Status: SHIPPED | OUTPATIENT
Start: 2023-02-10 | End: 2023-12-27 | Stop reason: CLARIF

## 2023-02-10 NOTE — TELEPHONE ENCOUNTER
I sent in phenergan tablets which may help. She could also come to clinic for a Toradol injection.     Patient had previously requested a referral to an external neurologist for treatment but unsure if she is still seeing this provider. If not, then we may need to refer to our headache specialist.

## 2023-07-31 ENCOUNTER — HOSPITAL ENCOUNTER (OUTPATIENT)
Dept: RADIOLOGY | Facility: HOSPITAL | Age: 48
Discharge: HOME | End: 2023-07-31
Attending: SPECIALIST
Payer: COMMERCIAL

## 2023-07-31 DIAGNOSIS — Z82.62 FAMILY HISTORY OF OSTEOPOROSIS: ICD-10-CM

## 2023-07-31 DIAGNOSIS — Z12.31 ENCOUNTER FOR SCREENING MAMMOGRAM FOR MALIGNANT NEOPLASM OF BREAST: ICD-10-CM

## 2023-07-31 PROCEDURE — 77080 DXA BONE DENSITY AXIAL SKELETON 1 OR MORE SITES: ICD-10-PCS | Mod: 26,,, | Performed by: RADIOLOGY

## 2023-07-31 PROCEDURE — 77067 MAMMO DIGITAL SCREENING BILAT WITH TOMO: ICD-10-PCS | Mod: 26,,, | Performed by: RADIOLOGY

## 2023-07-31 PROCEDURE — 77067 SCR MAMMO BI INCL CAD: CPT | Mod: TC,PO

## 2023-07-31 PROCEDURE — 77080 DXA BONE DENSITY AXIAL: CPT | Mod: TC,PO

## 2023-07-31 PROCEDURE — 77063 BREAST TOMOSYNTHESIS BI: CPT | Mod: 26,,, | Performed by: RADIOLOGY

## 2023-07-31 PROCEDURE — 77067 SCR MAMMO BI INCL CAD: CPT | Mod: 26,,, | Performed by: RADIOLOGY

## 2023-07-31 PROCEDURE — 77080 DXA BONE DENSITY AXIAL: CPT | Mod: 26,,, | Performed by: RADIOLOGY

## 2023-07-31 PROCEDURE — 77063 MAMMO DIGITAL SCREENING BILAT WITH TOMO: ICD-10-PCS | Mod: 26,,, | Performed by: RADIOLOGY

## 2023-09-06 DIAGNOSIS — R51.9 INTRACTABLE EPISODIC HEADACHE, UNSPECIFIED HEADACHE TYPE: ICD-10-CM

## 2023-09-06 RX ORDER — RIZATRIPTAN BENZOATE 10 MG/1
TABLET, ORALLY DISINTEGRATING ORAL
Qty: 27 TABLET | Refills: 0 | Status: SHIPPED | OUTPATIENT
Start: 2023-09-06 | End: 2024-01-16

## 2023-09-06 NOTE — TELEPHONE ENCOUNTER
Care Due:                  Date            Visit Type   Department     Provider  --------------------------------------------------------------------------------                                MYCHART                              FOLLOWUP/OF  [facility]  Last Visit: 11-      FICE VISIT   MEDICINE       Gerson Bell  Next Visit: None Scheduled  None         None Found                                                            Last  Test          Frequency    Reason                     Performed    Due Date  --------------------------------------------------------------------------------    Office Visit  12 months..  rizatriptan..............  11-   10-    [facility] Embedded Care Due Messages. Reference number: [id number].   9/06/2023 3:33:24 PM CDT

## 2023-12-05 ENCOUNTER — PATIENT MESSAGE (OUTPATIENT)
Dept: FAMILY MEDICINE | Facility: CLINIC | Age: 48
End: 2023-12-05
Payer: COMMERCIAL

## 2024-01-02 PROBLEM — R10.2 PELVIC PAIN IN FEMALE: Status: ACTIVE | Noted: 2024-01-02

## 2024-01-11 ENCOUNTER — PATIENT OUTREACH (OUTPATIENT)
Dept: ADMINISTRATIVE | Facility: HOSPITAL | Age: 49
End: 2024-01-11
Payer: COMMERCIAL

## 2024-01-11 NOTE — PROGRESS NOTES
Patient not seen by PCP in 12 months or longer Report: Called Pt to schedule labs & PCP visit. Pt declines saying I am recovering from an hysterectomy.

## 2024-01-15 DIAGNOSIS — R51.9 INTRACTABLE EPISODIC HEADACHE, UNSPECIFIED HEADACHE TYPE: ICD-10-CM

## 2024-01-15 NOTE — TELEPHONE ENCOUNTER
Care Due:                  Date            Visit Type   Department     Provider  --------------------------------------------------------------------------------                                MYCHART                              FOLLOWUP/OF  [facility]  Last Visit: 11-      FICE VISIT   MEDICINE       Gerson Bell  Next Visit: None Scheduled  None         None Found                                                            Last  Test          Frequency    Reason                     Performed    Due Date  --------------------------------------------------------------------------------    Office Visit  15 months..  rizatriptan..............  11- 01-    [facility] Embedded Care Due Messages. Reference number: [id number].   1/15/2024 2:15:54 PM CST

## 2024-01-16 RX ORDER — RIZATRIPTAN BENZOATE 10 MG/1
TABLET, ORALLY DISINTEGRATING ORAL
Qty: 27 TABLET | Refills: 0 | Status: SHIPPED | OUTPATIENT
Start: 2024-01-16 | End: 2024-03-20

## 2024-02-16 ENCOUNTER — LAB VISIT (OUTPATIENT)
Dept: LAB | Facility: HOSPITAL | Age: 49
End: 2024-02-16
Attending: SPECIALIST
Payer: COMMERCIAL

## 2024-02-16 DIAGNOSIS — N95.9 MENOPAUSAL PROBLEM: Primary | ICD-10-CM

## 2024-02-16 LAB
ALBUMIN SERPL BCP-MCNC: 3.9 G/DL (ref 3.5–5.2)
ALP SERPL-CCNC: 49 U/L (ref 55–135)
ALT SERPL W/O P-5'-P-CCNC: 13 U/L (ref 10–44)
AST SERPL-CCNC: 13 U/L (ref 10–40)
BASOPHILS # BLD AUTO: 0.07 K/UL (ref 0–0.2)
BASOPHILS NFR BLD: 1.7 % (ref 0–1.9)
BILIRUB DIRECT SERPL-MCNC: 0.2 MG/DL (ref 0.1–0.3)
BILIRUB SERPL-MCNC: 0.6 MG/DL (ref 0.1–1)
CHOLEST SERPL-MCNC: 210 MG/DL (ref 120–199)
CHOLEST/HDLC SERPL: 3.3 {RATIO} (ref 2–5)
DIFFERENTIAL METHOD BLD: ABNORMAL
EOSINOPHIL # BLD AUTO: 0.2 K/UL (ref 0–0.5)
EOSINOPHIL NFR BLD: 5.4 % (ref 0–8)
ERYTHROCYTE [DISTWIDTH] IN BLOOD BY AUTOMATED COUNT: 13.3 % (ref 11.5–14.5)
ESTRADIOL SERPL-MCNC: 22 PG/ML
FSH SERPL-ACNC: 55.21 MIU/ML
HCT VFR BLD AUTO: 40.7 % (ref 37–48.5)
HDLC SERPL-MCNC: 63 MG/DL (ref 40–75)
HDLC SERPL: 30 % (ref 20–50)
HGB BLD-MCNC: 12.9 G/DL (ref 12–16)
IMM GRANULOCYTES # BLD AUTO: 0.01 K/UL (ref 0–0.04)
IMM GRANULOCYTES NFR BLD AUTO: 0.2 % (ref 0–0.5)
LDLC SERPL CALC-MCNC: 129 MG/DL (ref 63–159)
LYMPHOCYTES # BLD AUTO: 1.5 K/UL (ref 1–4.8)
LYMPHOCYTES NFR BLD: 34.9 % (ref 18–48)
MCH RBC QN AUTO: 30.6 PG (ref 27–31)
MCHC RBC AUTO-ENTMCNC: 31.7 G/DL (ref 32–36)
MCV RBC AUTO: 96 FL (ref 82–98)
MONOCYTES # BLD AUTO: 0.4 K/UL (ref 0.3–1)
MONOCYTES NFR BLD: 8.5 % (ref 4–15)
NEUTROPHILS # BLD AUTO: 2.1 K/UL (ref 1.8–7.7)
NEUTROPHILS NFR BLD: 49.3 % (ref 38–73)
NONHDLC SERPL-MCNC: 147 MG/DL
NRBC BLD-RTO: 0 /100 WBC
PLATELET # BLD AUTO: 329 K/UL (ref 150–450)
PMV BLD AUTO: 10.4 FL (ref 9.2–12.9)
PROGEST SERPL-MCNC: 2.3 NG/ML
PROT SERPL-MCNC: 6.4 G/DL (ref 6–8.4)
RBC # BLD AUTO: 4.22 M/UL (ref 4–5.4)
TRIGL SERPL-MCNC: 90 MG/DL (ref 30–150)
WBC # BLD AUTO: 4.24 K/UL (ref 3.9–12.7)

## 2024-02-16 PROCEDURE — 85025 COMPLETE CBC W/AUTO DIFF WBC: CPT | Performed by: SPECIALIST

## 2024-02-16 PROCEDURE — 82642 DIHYDROTESTOSTERONE: CPT | Performed by: SPECIALIST

## 2024-02-16 PROCEDURE — 80076 HEPATIC FUNCTION PANEL: CPT | Performed by: SPECIALIST

## 2024-02-16 PROCEDURE — 83001 ASSAY OF GONADOTROPIN (FSH): CPT | Performed by: SPECIALIST

## 2024-02-16 PROCEDURE — 80061 LIPID PANEL: CPT | Performed by: SPECIALIST

## 2024-02-16 PROCEDURE — 82670 ASSAY OF TOTAL ESTRADIOL: CPT | Performed by: SPECIALIST

## 2024-02-16 PROCEDURE — 84144 ASSAY OF PROGESTERONE: CPT | Performed by: SPECIALIST

## 2024-02-16 PROCEDURE — 84402 ASSAY OF FREE TESTOSTERONE: CPT | Performed by: SPECIALIST

## 2024-02-16 PROCEDURE — 36415 COLL VENOUS BLD VENIPUNCTURE: CPT | Mod: PO | Performed by: SPECIALIST

## 2024-02-19 LAB — TESTOST FREE SERPL-MCNC: 0.6 PG/ML

## 2024-02-21 LAB — ANDROSTANOLONE SERPL-MCNC: 67 PG/ML

## 2024-03-19 DIAGNOSIS — R51.9 INTRACTABLE EPISODIC HEADACHE, UNSPECIFIED HEADACHE TYPE: ICD-10-CM

## 2024-03-19 NOTE — TELEPHONE ENCOUNTER
Care Due:                  Date            Visit Type   Department     Provider  --------------------------------------------------------------------------------                                MYCHART                              FOLLOWUP/OF  [facility]  Last Visit: 11-      FICE VISIT   MEDICINE       Gerson Bell  Next Visit: None Scheduled  None         None Found                                                            Last  Test          Frequency    Reason                     Performed    Due Date  --------------------------------------------------------------------------------    Office Visit  15 months..  rizatriptan..............  11- 01-    [facility] Embedded Care Due Messages. Reference number: [id number].   3/19/2024 12:19:40 PM CDT

## 2024-03-20 RX ORDER — RIZATRIPTAN BENZOATE 10 MG/1
TABLET, ORALLY DISINTEGRATING ORAL
Qty: 27 TABLET | Refills: 0 | Status: SHIPPED | OUTPATIENT
Start: 2024-03-20 | End: 2024-06-07

## 2024-03-20 NOTE — TELEPHONE ENCOUNTER
Refill Routing Note   Medication(s) are not appropriate for processing by [facility] for the following reason(s):        Patient not seen by provider within 15 months    [facility] action(s):  Defer   Requires appointment : Yes               Appointments  past 12m or future 3m with PCP    Date Provider   Last Visit   9/8/2021 Vilma Lemus MD   Next Visit   Visit date not found Vilma Lemus MD   ED visits in past 90 days: 0        Note composed:3:09 AM 03/20/2024

## 2024-06-07 DIAGNOSIS — R51.9 INTRACTABLE EPISODIC HEADACHE, UNSPECIFIED HEADACHE TYPE: ICD-10-CM

## 2024-06-07 RX ORDER — RIZATRIPTAN BENZOATE 10 MG/1
TABLET, ORALLY DISINTEGRATING ORAL
Qty: 27 TABLET | Refills: 0 | Status: SHIPPED | OUTPATIENT
Start: 2024-06-07

## 2024-06-07 NOTE — TELEPHONE ENCOUNTER
Refill Routing Note   Medication(s) are not appropriate for processing by [facility] for the following reason(s):        Patient not seen by provider within 15 months    [facility] action(s):  Defer     Requires appointment : Yes             Appointments  past 12m or future 3m with PCP    Date Provider   Last Visit   9/8/2021 Vilma Lemus MD   Next Visit   Visit date not found Vilma Lemus MD   ED visits in past 90 days: 0        Note composed:11:24 AM 06/07/2024

## 2024-06-07 NOTE — TELEPHONE ENCOUNTER
Care Due:                  Date            Visit Type   Department     Provider  --------------------------------------------------------------------------------                                MYCHART                              FOLLOWUP/OF  [facility]  Last Visit: 11-      FICE VISIT   MEDICINE       Gerson Bell  Next Visit: None Scheduled  None         None Found                                                            Last  Test          Frequency    Reason                     Performed    Due Date  --------------------------------------------------------------------------------    Office Visit  15 months..  rizatriptan..............  11- 01-    [facility] Embedded Care Due Messages. Reference number: [id number].   6/07/2024 11:02:02 AM CECELIAT

## 2024-07-15 ENCOUNTER — PATIENT MESSAGE (OUTPATIENT)
Dept: FAMILY MEDICINE | Facility: CLINIC | Age: 49
End: 2024-07-15
Payer: COMMERCIAL

## 2024-07-15 ENCOUNTER — OFFICE VISIT (OUTPATIENT)
Dept: FAMILY MEDICINE | Facility: CLINIC | Age: 49
End: 2024-07-15
Payer: COMMERCIAL

## 2024-07-15 VITALS
SYSTOLIC BLOOD PRESSURE: 113 MMHG | BODY MASS INDEX: 29.66 KG/M2 | WEIGHT: 178 LBS | DIASTOLIC BLOOD PRESSURE: 65 MMHG | HEIGHT: 65 IN | HEART RATE: 76 BPM | TEMPERATURE: 98 F

## 2024-07-15 DIAGNOSIS — Z12.11 COLON CANCER SCREENING: Primary | ICD-10-CM

## 2024-07-15 DIAGNOSIS — E55.9 VITAMIN D DEFICIENCY: ICD-10-CM

## 2024-07-15 DIAGNOSIS — M54.9 DORSALGIA, UNSPECIFIED: ICD-10-CM

## 2024-07-15 DIAGNOSIS — Z13.220 ENCOUNTER FOR LIPID SCREENING FOR CARDIOVASCULAR DISEASE: ICD-10-CM

## 2024-07-15 DIAGNOSIS — E05.90 SUBCLINICAL HYPERTHYROIDISM: Primary | ICD-10-CM

## 2024-07-15 DIAGNOSIS — Z13.6 ENCOUNTER FOR LIPID SCREENING FOR CARDIOVASCULAR DISEASE: ICD-10-CM

## 2024-07-15 DIAGNOSIS — Z00.00 ENCOUNTER FOR ANNUAL HEALTH EXAMINATION: ICD-10-CM

## 2024-07-15 DIAGNOSIS — Z13.1 DIABETES MELLITUS SCREENING: ICD-10-CM

## 2024-07-15 DIAGNOSIS — Z11.59 ENCOUNTER FOR HEPATITIS C SCREENING TEST FOR LOW RISK PATIENT: ICD-10-CM

## 2024-07-15 DIAGNOSIS — Z12.11 COLON CANCER SCREENING: ICD-10-CM

## 2024-07-15 DIAGNOSIS — Z12.31 ENCOUNTER FOR SCREENING MAMMOGRAM FOR BREAST CANCER: ICD-10-CM

## 2024-07-15 PROCEDURE — 3078F DIAST BP <80 MM HG: CPT | Mod: CPTII,S$GLB,, | Performed by: INTERNAL MEDICINE

## 2024-07-15 PROCEDURE — 99999 PR PBB SHADOW E&M-EST. PATIENT-LVL V: CPT | Mod: PBBFAC,,, | Performed by: INTERNAL MEDICINE

## 2024-07-15 PROCEDURE — 3008F BODY MASS INDEX DOCD: CPT | Mod: CPTII,S$GLB,, | Performed by: INTERNAL MEDICINE

## 2024-07-15 PROCEDURE — 99396 PREV VISIT EST AGE 40-64: CPT | Mod: S$GLB,,, | Performed by: INTERNAL MEDICINE

## 2024-07-15 PROCEDURE — 1159F MED LIST DOCD IN RCRD: CPT | Mod: CPTII,S$GLB,, | Performed by: INTERNAL MEDICINE

## 2024-07-15 PROCEDURE — 3074F SYST BP LT 130 MM HG: CPT | Mod: CPTII,S$GLB,, | Performed by: INTERNAL MEDICINE

## 2024-07-15 PROCEDURE — 1160F RVW MEDS BY RX/DR IN RCRD: CPT | Mod: CPTII,S$GLB,, | Performed by: INTERNAL MEDICINE

## 2024-07-15 RX ORDER — ESTRADIOL 0.1 MG/D
1 PATCH, EXTENDED RELEASE TRANSDERMAL
COMMUNITY

## 2024-07-15 RX ORDER — PROGESTERONE 100 MG/1
100 CAPSULE ORAL DAILY
COMMUNITY

## 2024-07-15 RX ORDER — ERGOCALCIFEROL 1.25 MG/1
CAPSULE ORAL
COMMUNITY

## 2024-07-16 ENCOUNTER — TELEPHONE (OUTPATIENT)
Dept: GASTROENTEROLOGY | Facility: CLINIC | Age: 49
End: 2024-07-16
Payer: COMMERCIAL

## 2024-07-16 NOTE — TELEPHONE ENCOUNTER
Spoke with pt and she was looking to have cscope in next few weeks. Pt offered next cscope openings would be October. Pt states she needs to check her work schedule and she will call us back.

## 2024-07-16 NOTE — TELEPHONE ENCOUNTER
Assessment:     1. Routine general medical examination at a health care facility    2. Elevated liver enzymes    3. Family history of colon cancer in father    4. Screening for colorectal cancer    5. Mixed hyperlipidemia    6. History of kidney stones    7. Weight loss    8. Paradoxical insomnia    9. Sleep disorder, circadian, jet lag type      Plan:     Routine general medical examination at a health care facility    BREAST CANCER screening Mammogram every 2 years  CERVICAL CANCER screening with HPV test every 5 years  Sooner if any problems or concerns  ==============================  Your #1 MEDICINE is 10 minutes of WALKING EVERY DAY to wake up your metabolism to burn off the food you eat. This also strengthens your blood vessels & heart to push through a potential blockage - heart attack or stroke in the future.   Keep normal blood pressure, cholesterol, sugars, and stop smoking  VACCINES: FLU yearly, PNEUMONIA at 65,  SHINGLES at 50  COLON CANCER screening colonoscopy starting at 44 yo  Eat more food grown from the earth (picked from trees or out of the ground)  Follow up yearly with LABS ONE WEEK PRIOR so we can discuss at your visit        Weight loss  Lost 15# w low carb, intermit fast    Sleep disorder, circadian, jet lag type  Trazodone doesn't help  AMbien #20 per year w flying    Screening for colorectal cancer  Order in Colonoscopy    Mixed hyperlipidemia  She frustrated bc lost 15# w diet changes - low carb, intermittent fasting & exercise   in 2024, test NMR Lipoprofile   Try to eat 5 fresh COLORS a day      History of kidney stones  Stable, no recurrence, hydrate well    Family history of colon cancer in father  Dad 74  Order in for Colonoscopy    Elevated liver enzymes  She has decreased wine 1-2/wk, lost 15#  Get liver ultrasound    CHIEF COMPLAINT: General exam    HPI: Meche Taylor is a 54 y.o. female with is here today for general exam.     Has lost wt w intermitt fast & low carb,  ----- Message from Mary Coleman LPN sent at 7/16/2024  9:58 AM CDT -----    ----- Message -----  From: Abhijeet Saenz  Sent: 7/16/2024   9:49 AM CDT  To: [facility] Staff    Type: Needs Medical Advice  Who Called:  pt  Best Call Back Number: [phone]    Additional Information: Pt is calling the office needs to be scheduled for colonoscopy.Please call back and advise   lost 15#    LDL elevated 165, ALT 61. Last time when ALT was 52, we discussed her decreasing wine - now only 1-2 / wk    The 10-year ASCVD risk score (Yang DERAS, et al., 2019) is: 1.9%    Values used to calculate the score:      Age: 54 years      Sex: Female      Is Non- : No      Diabetic: No      Tobacco smoker: No      Systolic Blood Pressure: 138 mmHg      Is BP treated: No      HDL Cholesterol: 72 mg/dL      Total Cholesterol: 257 mg/dL      Dad w colon cancer 70    Denies chest pain, shortness of breath    Current Outpatient Medications   Medication Instructions    fish oil-omega-3 fatty acids 300-1,000 mg capsule 2 g, Oral, Daily    folic acid (FOLVITE) 1 mg, Oral, Daily    multivitamin (THERAGRAN) per tablet 1 tablet, Oral, Daily    tamsulosin (FLOMAX) 0.4 mg, Oral, Daily    traZODone (DESYREL) 50 mg, Oral, Nightly PRN    TURMERIC ROOT EXTRACT ORAL Oral    vitamin D (VITAMIN D3) 185 mg, Oral, Daily    zolpidem (AMBIEN) 5 mg, Oral, Nightly PRN       No results found for: HGBA1C  No results found for: MICALBCREAT  Lab Results   Component Value Date    LDLCALC 165.8 (H) 07/20/2023    LDLCALC 122.4 01/04/2022    CHOL 257 (H) 07/20/2023    HDL 72 07/20/2023    TRIG 96 07/20/2023       Lab Results   Component Value Date     07/20/2023    K 4.3 07/20/2023     07/20/2023    CO2 23 07/20/2023     07/20/2023    BUN 11 07/20/2023    CREATININE 0.8 07/20/2023    CALCIUM 9.6 07/20/2023    PROT 7.7 07/20/2023    ALBUMIN 4.3 07/20/2023    BILITOT 0.4 07/20/2023    ALKPHOS 77 07/20/2023    AST 27 07/20/2023    ALT 51 (H) 07/20/2023    ANIONGAP 13 07/20/2023    ESTGFRAFRICA >60.0 01/04/2022    EGFRNONAA >60.0 01/04/2022    WBC 6.72 07/20/2023    HGB 13.6 07/20/2023    HGB 13.1 01/04/2022    HCT 42.5 07/20/2023    MCV 98 07/20/2023     07/20/2023    TSH 2.326 06/21/2019    HEPCAB Negative 08/21/2020       No results found for: LH, FSH, TOTALTESTOST, PROGESTERONE, ESTRADIOL,  "[id number], APPLWOUZ90, FERRITIN, IRON, TRANSFERRIN, TIBC, FESATURATED, ZINC      Past Medical History:   Diagnosis Date    Achilles tendinitis of left lower extremity 8/27/2020    Amos Medina    Elevated liver enzymes 1/11/2022    Family history of colon cancer in father 8/27/2020    74    History of kidney stones 7/27/2023    Mixed hyperlipidemia 8/27/2020    Sleep disorder, circadian, jet lag type 05/15/2017    Worse with steroid injection, trazodone helps, ambien 5mg with travel #20/yr     History reviewed. No pertinent surgical history.  Vitals:    07/27/23 1348   BP: 138/82   Pulse: 91   Resp: 20   Temp: 98.7 °F (37.1 °C)   TempSrc: Oral   SpO2: 98%   Weight: 77.5 kg (170 lb 13.7 oz)   Height: 5' 2" (1.575 m)   PainSc: 0-No pain     Objective:   Physical Exam  Constitutional:       Appearance: She is well-developed.   Eyes:      Pupils: Pupils are equal, round, and reactive to light.   Cardiovascular:      Rate and Rhythm: Normal rate and regular rhythm.      Heart sounds: Normal heart sounds. No murmur heard.  Pulmonary:      Effort: Pulmonary effort is normal.      Breath sounds: Normal breath sounds. No wheezing.   Abdominal:      General: Bowel sounds are normal. There is no distension.      Palpations: Abdomen is soft. There is no mass.      Tenderness: There is no abdominal tenderness. There is no guarding or rebound.   Musculoskeletal:      Cervical back: Neck supple.   Skin:     General: Skin is warm and dry.   Neurological:      Mental Status: She is alert.   Psychiatric:         Behavior: Behavior normal.                                   "

## 2024-07-16 NOTE — TELEPHONE ENCOUNTER
I have signed for the following orders AND/OR meds.  Please call the patient and ask the patient to schedule the testing AND/OR inform about any medications that were sent. Medications have been sent to pharmacy listed below      Orders Placed This Encounter   Procedures    Ambulatory referral/consult to Gastroenterology     Standing Status:   Future     Standing Expiration Date:   8/16/2025     Referral Priority:   Routine     Referral Type:   Consultation     Referral Reason:   Specialty Services Required     Referred to Provider:   [facility] [place]     Requested Specialty:   Gastroenterology     Number of Visits Requested:   1              [facility] [place] - [place], [place] - [address]  [address] [address]  Phone: [phone] Fax: [phone]    [facility] - [place] - [address] [address]  [facility] [address]  Phone: [phone] Fax: [phone]    [id number] [place], [place] - [address]  [address] [facility] [address]  Phone: [phone] Fax: [phone]

## 2024-07-29 NOTE — PROGRESS NOTES
Assessment/Plan:    Patient here for annual wellness exam. Health maintenance was reviewed and ordered.    Complete history and physical was completed today.  Complete and thorough medication reconciliation was performed.  Discussed risks and benefits of medications.  Advised patient on orders and health maintenance. Continue current medications listed on your summary sheet.    All questions were answered. Patient had no further concerns. Advised of diagnoses and plan, as listed below.    Problem List Items Addressed This Visit          Endocrine    Subclinical hyperthyroidism - Primary    Overview     -evaluated by Endocrinology in the past with recs to continue to monitor  -TSH previously slightly low with normal T4  -last TSH within normal limits. Due for updated labs  -denies symptoms         Relevant Orders    TSH    T4, Free    T3     Other Visit Diagnoses       Encounter for lipid screening for cardiovascular disease        Encounter for hepatitis C screening test for low risk patient        Relevant Orders    Hepatitis C Antibody    Diabetes mellitus screening        Relevant Orders    Hemoglobin A1C    Vitamin D deficiency        Relevant Orders    Vitamin D    Colon cancer screening        Relevant Orders    Case Request Endoscopy: COLONOSCOPY (Completed)    Encounter for screening mammogram for breast cancer        Relevant Orders    Mammo Digital Screening Bilat w/ Vijay    Dorsalgia, unspecified      -continued lower back pain despite >6 wks of conservative management  -plan to pursue further work up with MRI    Relevant Orders    MRI Lumbar Spine Without Contrast    Encounter for annual health examination                Vilma Lemus MD       WELLNESS EXAM      Patient ID: Vijaya Redding is a 48 y.o. female.  has a past medical history of Abnormal Pap smear, Herpes genitalia, and Migraines.     Chief Complaint:  Encounter for wellness exam    Well Adult Physical: Patient here for a comprehensive  physical exam.     History of Present Illness  The patient has recently initiated chiropractic treatment, which has significantly improved her neck issues. She continues to have occasional migraines. She has not required use of Maxalt for the past 2 to 3 months. She reports no adverse effects from Maxalt, although she occasionally takes 2 tablets consecutively. She has previously consulted with Dr. Silva, who recommended a nerve block, which she declined.    The patient expresses a desire to schedule a colonoscopy. She underwent a bone scan, which revealed osteopenia. She is currently on vitamin D supplementation. She denies experiencing chest pain, shortness of breath, or leg swelling.    The patient is currently undergoing physical therapy for her SI joint pain. 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 She denies leg weakness, numbness, or tingling. Despite conservative management for her lower back, she continues to have persistent symptoms.     No other new complaints today.      Health Maintenance Topics with due status: Not Due       Topic Last Completion Date    Influenza Vaccine 09/05/2014    Colorectal Cancer Screening 07/17/2023    Lipid Panel 02/16/2024      ==============================================  Health Maintenance Due   Topic Date Due    Hepatitis C Screening  Never done    HIV Screening  Never done    TETANUS VACCINE  Never done    Hemoglobin A1c (Diabetic Prevention Screening)  Never done    COVID-19 Vaccine (1 - 2023-24 season) Never done    Mammogram  07/31/2024       Past Medical History:  Past Medical History:   Diagnosis Date    Abnormal Pap smear     repeat pap    Herpes genitalia     Migraines      Past Surgical History:   Procedure Laterality Date    breast implants  2001    CYSTOSCOPY N/A 01/02/2024    Procedure: CYSTOSCOPY;  Surgeon: Jodi Small MD;  Location: [facility];  Service: OB/GYN;  Laterality: N/A;    dx lap      endometriosis    HYSTERECTOMY      ROBOT-ASSISTED  LAPAROSCOPIC HYSTERECTOMY N/A 01/02/2024    Procedure: ROBOTIC HYSTERECTOMY;  Surgeon: Jodi Small MD;  Location: [facility];  Service: OB/GYN;  Laterality: N/A;    ROBOT-ASSISTED LAPAROSCOPIC SALPINGO-OOPHORECTOMY Bilateral 01/02/2024    Procedure: ROBOTIC SALPINGO-OOPHORECTOMY;  Surgeon: Jodi Small MD;  Location: [facility] OR;  Service: OB/GYN;  Laterality: Bilateral;    TONSILLECTOMY       Review of patient's allergies indicates:   Allergen Reactions    Adhesive tape-silicones Blisters    Sulfa (sulfonamide antibiotics)      Current Outpatient Medications on File Prior to Visit   Medication Sig Dispense Refill    CAROLYN 0.1 mg/24 hr PTSW Place 1 patch onto the skin twice a week.      ergocalciferol (ERGOCALCIFEROL) 50,000 unit Cap Take 1 capsule twice a week by oral route.      mupirocin (BACTROBAN) 2 % ointment 1 g by Nasal route 2 (two) times daily.      progesterone (PROMETRIUM) 100 MG capsule Take 100 mg by mouth once daily.      rizatriptan (MAXALT-MLT) 10 MG disintegrating tablet TAKE 1 TABLET BY MOUTH AS NEEDED FOR HEADACHE *MAY REPEAT IN 2 HOURS NEEDED 27 tablet 0    sertraline (ZOLOFT) 50 MG tablet Take 50 mg by mouth once daily.      valACYclovir (VALTREX) 500 MG tablet Take 500 mg by mouth once daily.       No current facility-administered medications on file prior to visit.     Social History     Socioeconomic History    Marital status:     Number of children: 2   Occupational History     Employer: [employer]   Tobacco Use    Smoking status: Never    Smokeless tobacco: Never   Substance and Sexual Activity    Alcohol use: Yes     Comment: socially    Drug use: No    Sexual activity: Yes     Partners: Male     Family History   Problem Relation Name Age of Onset    Osteoporosis Mother      Arthritis Mother      Goiter Mother      Ovarian cancer Mother      Alcohol abuse Father      Hypothyroidism Sister      Cancer Paternal Aunt          unknown GYN in origin    Ovarian  cancer Paternal Aunt      Breast cancer Paternal Aunt      Arthritis Maternal Grandmother      Thyroid cancer Neg Hx         Review of Systems   Constitutional:  Negative for chills, fever and malaise/fatigue.   HENT:  Negative for congestion and sore throat.    Eyes:  Negative for blurred vision and double vision.   Respiratory:  Negative for cough and shortness of breath.    Cardiovascular:  Negative for chest pain, palpitations and leg swelling.   Gastrointestinal:  Negative for abdominal pain, constipation and diarrhea.   Genitourinary:  Negative for dysuria and frequency.   Musculoskeletal:  Positive for back pain.   Neurological:  Negative for headaches.   Psychiatric/Behavioral:  Negative for depression. The patient is not nervous/anxious.            Objective:      Vitals:    07/15/24 1439   BP: 113/65   Pulse: 76   Temp: 97.7 °F (36.5 °C)     Body mass index is 29.62 kg/m².     Physical Exam  Constitutional:       General: She is not in acute distress.     Appearance: Normal appearance. She is well-developed.   HENT:      Head: Normocephalic and atraumatic.   Eyes:      Conjunctiva/sclera: Conjunctivae normal.   Cardiovascular:      Rate and Rhythm: Normal rate and regular rhythm.      Pulses: Normal pulses.      Heart sounds: Normal heart sounds. No murmur heard.  Pulmonary:      Effort: Pulmonary effort is normal. No respiratory distress.      Breath sounds: Normal breath sounds.   Abdominal:      General: Bowel sounds are normal. There is no distension.      Palpations: Abdomen is soft.      Tenderness: There is no abdominal tenderness.   Musculoskeletal:         General: Normal range of motion.      Cervical back: Normal range of motion and neck supple.   Skin:     General: Skin is warm and dry.      Findings: No rash.   Neurological:      General: No focal deficit present.      Mental Status: She is alert and oriented to person, place, and time.   Psychiatric:         Mood and Affect: Mood normal.          Behavior: Behavior normal.             All labs, imaging and procedures performed since last visit have been personally reviewed.    DISCLAIMER: This note was compiled by using a speech recognition dictation system and therefore please be aware that typographical / speech recognition errors can and do occur.  Please contact me if you see any errors specifically.  Consent was obtained for REJI recording system prior to the visit.

## 2024-08-12 ENCOUNTER — PATIENT MESSAGE (OUTPATIENT)
Dept: FAMILY MEDICINE | Facility: CLINIC | Age: 49
End: 2024-08-12
Payer: COMMERCIAL

## 2024-08-16 ENCOUNTER — TELEPHONE (OUTPATIENT)
Dept: PRIMARY CARE CLINIC | Facility: CLINIC | Age: 49
End: 2024-08-16
Payer: COMMERCIAL

## 2024-08-16 DIAGNOSIS — G89.29 CHRONIC BILATERAL LOW BACK PAIN WITHOUT SCIATICA: Primary | ICD-10-CM

## 2024-08-16 DIAGNOSIS — M54.50 CHRONIC BILATERAL LOW BACK PAIN WITHOUT SCIATICA: Primary | ICD-10-CM

## 2024-08-19 ENCOUNTER — TELEPHONE (OUTPATIENT)
Dept: PAIN MEDICINE | Facility: CLINIC | Age: 49
End: 2024-08-19
Payer: COMMERCIAL

## 2024-08-28 ENCOUNTER — TELEPHONE (OUTPATIENT)
Dept: GASTROENTEROLOGY | Facility: CLINIC | Age: 49
End: 2024-08-28
Payer: COMMERCIAL

## 2024-08-28 NOTE — TELEPHONE ENCOUNTER
Pt states that she will call us back in next few days to schedule. She does not have calendar nearby to see dates. Phone number provided.

## 2024-09-06 ENCOUNTER — TELEPHONE (OUTPATIENT)
Dept: GASTROENTEROLOGY | Facility: CLINIC | Age: 49
End: 2024-09-06
Payer: COMMERCIAL

## 2024-09-21 ENCOUNTER — HOSPITAL ENCOUNTER (OUTPATIENT)
Dept: RADIOLOGY | Facility: HOSPITAL | Age: 49
Discharge: HOME | End: 2024-09-21
Attending: INTERNAL MEDICINE
Payer: COMMERCIAL

## 2024-09-21 DIAGNOSIS — Z12.31 ENCOUNTER FOR SCREENING MAMMOGRAM FOR BREAST CANCER: ICD-10-CM

## 2024-09-21 PROCEDURE — 77067 SCR MAMMO BI INCL CAD: CPT | Mod: TC,PO

## 2024-09-21 PROCEDURE — 77063 BREAST TOMOSYNTHESIS BI: CPT | Mod: 26,,, | Performed by: RADIOLOGY

## 2024-09-21 PROCEDURE — 77067 SCR MAMMO BI INCL CAD: CPT | Mod: 26,,, | Performed by: RADIOLOGY

## 2024-11-04 ENCOUNTER — HOSPITAL ENCOUNTER (OUTPATIENT)
Dept: RADIOLOGY | Facility: HOSPITAL | Age: 49
Discharge: HOME | End: 2024-11-04
Attending: SPECIALIST
Payer: COMMERCIAL

## 2024-11-04 DIAGNOSIS — M25.559 PAIN IN UNSPECIFIED HIP: ICD-10-CM

## 2024-11-04 PROCEDURE — 73521 X-RAY EXAM HIPS BI 2 VIEWS: CPT | Mod: TC,PO

## 2024-11-18 ENCOUNTER — PATIENT MESSAGE (OUTPATIENT)
Dept: FAMILY MEDICINE | Facility: CLINIC | Age: 49
End: 2024-11-18
Payer: COMMERCIAL

## 2024-11-18 DIAGNOSIS — Z12.11 COLON CANCER SCREENING: Primary | ICD-10-CM

## 2024-11-18 NOTE — TELEPHONE ENCOUNTER
I have signed for the following orders AND/OR meds.  Please call the patient and ask the patient to schedule the testing AND/OR inform about any medications that were sent. Medications have been sent to pharmacy listed below      Orders Placed This Encounter   Procedures    Case Request Endoscopy: COLONOSCOPY, SCREENING, LOW RISK PATIENT     Order Specific Question:   Medical Necessity:     Answer:   Medically Non-Urgent [100]     Order Specific Question:   Case Referring Provider     Answer:   AMENA JOHNS [[id number]]     Order Specific Question:   Is an on-site pathologist required for this procedure?     Answer:   N/A              [facility] [place] [address]  [address] [address]  Phone: [phone] Fax: [phone]    [facility] [place] [place] - [address]  [address] [facility] [address]  [facility] [address]  Phone: [phone] Fax: [phone]    [id number] - [place], [place] - [address]  [address] [address]  Phone: [phone] Fax: [phone]

## 2024-11-27 ENCOUNTER — TELEPHONE (OUTPATIENT)
Dept: GASTROENTEROLOGY | Facility: CLINIC | Age: 49
End: 2024-11-27
Payer: COMMERCIAL

## 2024-11-27 NOTE — TELEPHONE ENCOUNTER
----- Message from Jing sent at 11/27/2024 12:16 PM CST -----  Regarding: colonoscopy  Contact: patient  Type:  Sooner Appointment Request    Caller is requesting a sooner appointment.  Caller declined first available appointment listed below.  Caller will not accept being placed on the waitlist and is requesting a message be sent to doctor.    Name of Caller:  patient  When is the first available appointment?    Symptoms:  colonoscopy  Would the patient rather a call back or a response via [facility]? call  Best Call Back Number:  [phone] (home)     Additional Information:  Please call patient to advise.   Thanks!

## 2024-12-04 ENCOUNTER — TELEPHONE (OUTPATIENT)
Dept: GASTROENTEROLOGY | Facility: CLINIC | Age: 49
End: 2024-12-04
Payer: COMMERCIAL

## 2024-12-04 NOTE — TELEPHONE ENCOUNTER
Spoke with pt. Scheduled c-scope. Prep instructions sent to pt's mychart. Pt verbalized understanding to all.

## 2024-12-31 DIAGNOSIS — R51.9 INTRACTABLE EPISODIC HEADACHE, UNSPECIFIED HEADACHE TYPE: ICD-10-CM

## 2024-12-31 RX ORDER — RIZATRIPTAN BENZOATE 10 MG/1
TABLET, ORALLY DISINTEGRATING ORAL
Qty: 27 TABLET | Refills: 0 | Status: CANCELLED | OUTPATIENT
Start: 2024-12-31

## 2024-12-31 NOTE — TELEPHONE ENCOUNTER
No care due was identified.  [facility] Embedded Care Due Messages. Reference number: [id number].   12/31/2024 9:44:24 AM CST

## 2025-01-02 RX ORDER — RIZATRIPTAN BENZOATE 10 MG/1
TABLET, ORALLY DISINTEGRATING ORAL
Qty: 27 TABLET | Refills: 0 | Status: SHIPPED | OUTPATIENT
Start: 2025-01-02

## 2025-01-02 NOTE — TELEPHONE ENCOUNTER
I have signed for the following orders AND/OR meds.  Please call the patient and ask the patient to schedule the testing AND/OR inform about any medications that were sent. Medications have been sent to pharmacy listed below      No orders of the defined types were placed in this encounter.      Medications Ordered This Encounter   Medications    rizatriptan (MAXALT-MLT) 10 MG disintegrating tablet     Sig: TAKE 1 TABLET BY MOUTH AS NEEDED FOR HEADACHE *MAY REPEAT IN 2 HOURS NEEDED     Dispense:  27 tablet     Refill:  0         [facility] [place] - [address]  [address] [facility] [address] [facility] [address]  Phone: [phone] Fax: [phone]    [facility] [place] [place] [place] - [address] [address]  [address] [address]  [facility] [address]  Phone: [phone] Fax: [phone]    [id number] [place], [place] - [id number] [facility] [address]  Phone: [phone] Fax: [phone]

## 2025-02-27 DIAGNOSIS — R51.9 INTRACTABLE EPISODIC HEADACHE, UNSPECIFIED HEADACHE TYPE: ICD-10-CM

## 2025-02-27 RX ORDER — RIZATRIPTAN BENZOATE 10 MG/1
TABLET, ORALLY DISINTEGRATING ORAL
Qty: 27 TABLET | Refills: 1 | Status: SHIPPED | OUTPATIENT
Start: 2025-02-27

## 2025-02-27 NOTE — TELEPHONE ENCOUNTER
Refill Decision Note   Vijaya Vahid  is requesting a refill authorization.  Brief Assessment and Rationale for Refill:  Approve     Medication Therapy Plan:        Comments:     Note composed:1:28 PM 02/27/2025

## 2025-02-27 NOTE — TELEPHONE ENCOUNTER
No care due was identified.  [facility] Embedded Care Due Messages. Reference number: [id number].   2/27/2025 1:25:18 PM CST

## 2025-06-27 DIAGNOSIS — R51.9 INTRACTABLE EPISODIC HEADACHE, UNSPECIFIED HEADACHE TYPE: ICD-10-CM

## 2025-06-27 RX ORDER — RIZATRIPTAN BENZOATE 10 MG/1
TABLET, ORALLY DISINTEGRATING ORAL
Qty: 27 TABLET | Refills: 0 | Status: SHIPPED | OUTPATIENT
Start: 2025-06-27

## 2025-06-27 NOTE — TELEPHONE ENCOUNTER
No care due was identified.  [facility] Embedded Care Due Messages. Reference number: [id number].   6/27/2025 9:18:47 AM CDT

## 2025-06-27 NOTE — TELEPHONE ENCOUNTER
Refill Decision Note   Vijaya Vahid  is requesting a refill authorization.  Brief Assessment and Rationale for Refill:  Approve     Medication Therapy Plan:         Comments:     Note composed:3:24 PM 06/27/2025

## 2025-07-21 ENCOUNTER — LAB VISIT (OUTPATIENT)
Dept: LAB | Facility: HOSPITAL | Age: 50
End: 2025-07-21
Attending: NURSE PRACTITIONER
Payer: COMMERCIAL

## 2025-07-21 DIAGNOSIS — Z00.00 ROUTINE GENERAL MEDICAL EXAMINATION AT A HEALTH CARE FACILITY: Primary | ICD-10-CM

## 2025-07-21 LAB
ABSOLUTE EOSINOPHIL (OHS): 0.15 K/UL
ABSOLUTE MONOCYTE (OHS): 0.42 K/UL (ref 0.3–1)
ABSOLUTE NEUTROPHIL COUNT (OHS): 2.14 K/UL (ref 1.8–7.7)
ALBUMIN SERPL BCP-MCNC: 4.1 G/DL (ref 3.5–5.2)
ALP SERPL-CCNC: 53 UNIT/L (ref 40–150)
ALT SERPL W/O P-5'-P-CCNC: 17 UNIT/L (ref 10–44)
ANION GAP (OHS): 7 MMOL/L (ref 8–16)
AST SERPL-CCNC: 17 UNIT/L (ref 11–45)
BASOPHILS # BLD AUTO: 0.07 K/UL
BASOPHILS NFR BLD AUTO: 1.7 %
BILIRUB DIRECT SERPL-MCNC: 0.2 MG/DL (ref 0.1–0.3)
BILIRUB SERPL-MCNC: 0.5 MG/DL (ref 0.1–1)
BUN SERPL-MCNC: 12 MG/DL (ref 6–20)
CALCIUM SERPL-MCNC: 9.1 MG/DL (ref 8.7–10.5)
CHLORIDE SERPL-SCNC: 103 MMOL/L (ref 95–110)
CHOLEST SERPL-MCNC: 232 MG/DL (ref 120–199)
CHOLEST/HDLC SERPL: 3.1 {RATIO} (ref 2–5)
CO2 SERPL-SCNC: 27 MMOL/L (ref 23–29)
CREAT SERPL-MCNC: 0.7 MG/DL (ref 0.5–1.4)
EAG (OHS): 91 MG/DL (ref 68–131)
ERYTHROCYTE [DISTWIDTH] IN BLOOD BY AUTOMATED COUNT: 13.2 % (ref 11.5–14.5)
GFR SERPLBLD CREATININE-BSD FMLA CKD-EPI: >60 ML/MIN/1.73/M2
GLUCOSE SERPL-MCNC: 73 MG/DL (ref 70–110)
HBA1C MFR BLD: 4.8 % (ref 4–5.6)
HCT VFR BLD AUTO: 41.5 % (ref 37–48.5)
HDLC SERPL-MCNC: 74 MG/DL (ref 40–75)
HDLC SERPL: 31.9 % (ref 20–50)
HGB BLD-MCNC: 12.9 GM/DL (ref 12–16)
IMM GRANULOCYTES # BLD AUTO: 0.01 K/UL (ref 0–0.04)
IMM GRANULOCYTES NFR BLD AUTO: 0.2 % (ref 0–0.5)
LDLC SERPL CALC-MCNC: 141.4 MG/DL (ref 63–159)
LYMPHOCYTES # BLD AUTO: 1.37 K/UL (ref 1–4.8)
MCH RBC QN AUTO: 29.8 PG (ref 27–31)
MCHC RBC AUTO-ENTMCNC: 31.1 G/DL (ref 32–36)
MCV RBC AUTO: 96 FL (ref 82–98)
NONHDLC SERPL-MCNC: 158 MG/DL
NUCLEATED RBC (/100WBC) (OHS): 0 /100 WBC
PLATELET # BLD AUTO: 342 K/UL (ref 150–450)
PMV BLD AUTO: 10 FL (ref 9.2–12.9)
POTASSIUM SERPL-SCNC: 4.1 MMOL/L (ref 3.5–5.1)
PROT SERPL-MCNC: 7 GM/DL (ref 6–8.4)
RBC # BLD AUTO: 4.33 M/UL (ref 4–5.4)
RELATIVE EOSINOPHIL (OHS): 3.6 %
RELATIVE LYMPHOCYTE (OHS): 32.9 % (ref 18–48)
RELATIVE MONOCYTE (OHS): 10.1 % (ref 4–15)
RELATIVE NEUTROPHIL (OHS): 51.5 % (ref 38–73)
SODIUM SERPL-SCNC: 137 MMOL/L (ref 136–145)
TRIGL SERPL-MCNC: 83 MG/DL (ref 30–150)
TSH SERPL-ACNC: 0.59 UIU/ML (ref 0.4–4)
WBC # BLD AUTO: 4.16 K/UL (ref 3.9–12.7)

## 2025-07-21 PROCEDURE — 85025 COMPLETE CBC W/AUTO DIFF WBC: CPT

## 2025-07-21 PROCEDURE — 83036 HEMOGLOBIN GLYCOSYLATED A1C: CPT

## 2025-07-21 PROCEDURE — 82310 ASSAY OF CALCIUM: CPT

## 2025-07-21 PROCEDURE — 80061 LIPID PANEL: CPT

## 2025-07-21 PROCEDURE — 82248 BILIRUBIN DIRECT: CPT

## 2025-07-21 PROCEDURE — 36415 COLL VENOUS BLD VENIPUNCTURE: CPT | Mod: PO

## 2025-07-21 PROCEDURE — 84443 ASSAY THYROID STIM HORMONE: CPT
